# Patient Record
Sex: FEMALE | Race: WHITE | URBAN - NONMETROPOLITAN AREA
[De-identification: names, ages, dates, MRNs, and addresses within clinical notes are randomized per-mention and may not be internally consistent; named-entity substitution may affect disease eponyms.]

---

## 2022-04-05 ENCOUNTER — OFFICE VISIT (OUTPATIENT)
Dept: FAMILY MEDICINE CLINIC | Facility: CLINIC | Age: 62
End: 2022-04-05
Payer: COMMERCIAL

## 2022-04-05 VITALS
SYSTOLIC BLOOD PRESSURE: 132 MMHG | HEIGHT: 66.5 IN | TEMPERATURE: 99 F | BODY MASS INDEX: 38.83 KG/M2 | OXYGEN SATURATION: 95 % | DIASTOLIC BLOOD PRESSURE: 66 MMHG | HEART RATE: 84 BPM | RESPIRATION RATE: 12 BRPM | WEIGHT: 244.5 LBS

## 2022-04-05 DIAGNOSIS — L30.8 OTHER ECZEMA: Primary | ICD-10-CM

## 2022-04-05 DIAGNOSIS — F17.200 TOBACCO DEPENDENCE: ICD-10-CM

## 2022-04-05 DIAGNOSIS — Z12.31 VISIT FOR SCREENING MAMMOGRAM: ICD-10-CM

## 2022-04-05 DIAGNOSIS — Z12.11 SCREEN FOR COLON CANCER: ICD-10-CM

## 2022-04-05 PROCEDURE — 3075F SYST BP GE 130 - 139MM HG: CPT | Performed by: FAMILY MEDICINE

## 2022-04-05 PROCEDURE — 3008F BODY MASS INDEX DOCD: CPT | Performed by: FAMILY MEDICINE

## 2022-04-05 PROCEDURE — 99204 OFFICE O/P NEW MOD 45 MIN: CPT | Performed by: FAMILY MEDICINE

## 2022-04-05 PROCEDURE — 3078F DIAST BP <80 MM HG: CPT | Performed by: FAMILY MEDICINE

## 2022-04-05 RX ORDER — VIT A/VIT C/VIT E/ZINC/COPPER 7160-113
TABLET, DELAYED RELEASE (ENTERIC COATED) ORAL
COMMUNITY

## 2022-04-05 RX ORDER — CLOBETASOL PROPIONATE 0.5 MG/G
1 CREAM TOPICAL 2 TIMES DAILY
Qty: 60 G | Refills: 0 | Status: SHIPPED | OUTPATIENT
Start: 2022-04-05

## 2022-04-05 RX ORDER — PREDNISONE 20 MG/1
TABLET ORAL
Qty: 15 TABLET | Refills: 0 | Status: SHIPPED | OUTPATIENT
Start: 2022-04-05 | End: 2022-04-15

## 2022-04-05 RX ORDER — VARENICLINE TARTRATE 0.5 (11)-1
KIT ORAL
Qty: 1 EACH | Refills: 0 | Status: SHIPPED | OUTPATIENT
Start: 2022-04-05

## 2022-05-02 ENCOUNTER — TELEPHONE (OUTPATIENT)
Dept: FAMILY MEDICINE CLINIC | Facility: CLINIC | Age: 62
End: 2022-05-02

## 2022-05-02 RX ORDER — FUROSEMIDE 20 MG/1
20 TABLET ORAL DAILY
Qty: 30 TABLET | Refills: 0 | Status: SHIPPED | OUTPATIENT
Start: 2022-05-02

## 2022-05-02 NOTE — TELEPHONE ENCOUNTER
I see no water pill on her discharge medication list.    I think she could benefit from furosemide 20 mg daily. I would generally have her take it in the morning but she can take the first dose today if she gets it before 5. We can discuss whether she needs it long-term when I see her in follow-up.   Please send 30 tablets to the pharmacy

## 2022-05-02 NOTE — TELEPHONE ENCOUNTER
WANTS TO TALK TO NURSE ABOUT LEG SWELLING & BLISTERS ON LEGS, WAS DISCHARGED FROM HOSPITAL & WILL NEED FOLLOW UP APPT, CALL

## 2022-05-02 NOTE — TELEPHONE ENCOUNTER
Song from [facility] calling to verify dosage of magnesium po. Pt plavix & xarelot indicates there could be an increase of bleeding so they need to let us know that this is a possibility. She did discuss plavix & grapefruit juice with pt & advised she can't have grapefruit juice.

## 2022-05-02 NOTE — TELEPHONE ENCOUNTER
Patient was just released from [address] [place] [address] on Saturday. Yesterday her legs started to swell and had a pea sized blister. Today Galina Hankins states the blister is now a nickel size. Patient states she is elevating her legs.   Please advise

## 2022-05-04 ENCOUNTER — MED REC SCAN ONLY (OUTPATIENT)
Dept: FAMILY MEDICINE CLINIC | Facility: CLINIC | Age: 62
End: 2022-05-04

## 2022-05-10 ENCOUNTER — OFFICE VISIT (OUTPATIENT)
Dept: FAMILY MEDICINE CLINIC | Facility: CLINIC | Age: 62
End: 2022-05-10
Payer: COMMERCIAL

## 2022-05-10 VITALS
BODY MASS INDEX: 35.57 KG/M2 | SYSTOLIC BLOOD PRESSURE: 110 MMHG | HEART RATE: 90 BPM | DIASTOLIC BLOOD PRESSURE: 60 MMHG | OXYGEN SATURATION: 97 % | HEIGHT: 66.5 IN | TEMPERATURE: 99 F | WEIGHT: 224 LBS

## 2022-05-10 DIAGNOSIS — S27.0XXS TRAUMATIC PNEUMOTHORAX, SEQUELA: ICD-10-CM

## 2022-05-10 DIAGNOSIS — I73.9 LOWER EXTREMITY ARTERIAL INSUFFICIENCY, SEVERE, LEFT (HCC): ICD-10-CM

## 2022-05-10 DIAGNOSIS — F17.200 TOBACCO DEPENDENCE: ICD-10-CM

## 2022-05-10 DIAGNOSIS — L90.5: ICD-10-CM

## 2022-05-10 DIAGNOSIS — L97.921 ULCER OF LEFT LOWER LEG, LIMITED TO BREAKDOWN OF SKIN (HCC): ICD-10-CM

## 2022-05-10 DIAGNOSIS — J96.01 ACUTE RESPIRATORY FAILURE WITH HYPOXIA AND HYPERCAPNIA (HCC): Primary | ICD-10-CM

## 2022-05-10 DIAGNOSIS — J96.02 ACUTE RESPIRATORY FAILURE WITH HYPOXIA AND HYPERCAPNIA (HCC): Primary | ICD-10-CM

## 2022-05-10 PROCEDURE — 3078F DIAST BP <80 MM HG: CPT | Performed by: FAMILY MEDICINE

## 2022-05-10 PROCEDURE — 99214 OFFICE O/P EST MOD 30 MIN: CPT | Performed by: FAMILY MEDICINE

## 2022-05-10 PROCEDURE — 3008F BODY MASS INDEX DOCD: CPT | Performed by: FAMILY MEDICINE

## 2022-05-10 PROCEDURE — 3074F SYST BP LT 130 MM HG: CPT | Performed by: FAMILY MEDICINE

## 2022-05-10 RX ORDER — AMOXICILLIN AND CLAVULANATE POTASSIUM 875; 125 MG/1; MG/1
1 TABLET, FILM COATED ORAL 2 TIMES DAILY
COMMUNITY
Start: 2022-04-30

## 2022-05-10 RX ORDER — CLOPIDOGREL BISULFATE 75 MG/1
75 TABLET ORAL DAILY
COMMUNITY
Start: 2022-05-01

## 2022-05-10 RX ORDER — RIVAROXABAN 20 MG/1
TABLET, FILM COATED ORAL
COMMUNITY
Start: 2022-04-30

## 2022-05-10 RX ORDER — ALBUTEROL SULFATE 90 UG/1
2 AEROSOL, METERED RESPIRATORY (INHALATION)
COMMUNITY
Start: 2022-04-30

## 2022-05-13 ENCOUNTER — TELEPHONE (OUTPATIENT)
Dept: FAMILY MEDICINE CLINIC | Facility: CLINIC | Age: 62
End: 2022-05-13

## 2022-05-19 ENCOUNTER — MED REC SCAN ONLY (OUTPATIENT)
Dept: FAMILY MEDICINE CLINIC | Facility: CLINIC | Age: 62
End: 2022-05-19

## 2022-05-23 ENCOUNTER — TELEPHONE (OUTPATIENT)
Dept: FAMILY MEDICINE CLINIC | Facility: CLINIC | Age: 62
End: 2022-05-23

## 2022-05-23 DIAGNOSIS — J96.02 ACUTE RESPIRATORY FAILURE WITH HYPOXIA AND HYPERCAPNIA (HCC): Primary | ICD-10-CM

## 2022-05-23 DIAGNOSIS — S27.0XXS TRAUMATIC PNEUMOTHORAX, SEQUELA: ICD-10-CM

## 2022-05-23 DIAGNOSIS — J96.01 ACUTE RESPIRATORY FAILURE WITH HYPOXIA AND HYPERCAPNIA (HCC): Primary | ICD-10-CM

## 2022-05-23 DIAGNOSIS — I73.9 LOWER EXTREMITY ARTERIAL INSUFFICIENCY, SEVERE, LEFT (HCC): ICD-10-CM

## 2022-05-23 RX ORDER — FUROSEMIDE 20 MG/1
TABLET ORAL
Qty: 30 TABLET | Refills: 0 | OUTPATIENT
Start: 2022-05-23

## 2022-05-23 NOTE — TELEPHONE ENCOUNTER
Avi cotter from [facility] calling. Pt needs order for physical therapy. Pt would like to go somewhere in [place]. She will see pt May 31.   She would like pt to start in June

## 2022-05-23 NOTE — TELEPHONE ENCOUNTER
Discontinued - furosemide 20 MG Oral Tab    Take 1 tablet (20 mg total) by mouth daily. , Normal, Disp-30 tablet, R-0   Dispense: 30 tablet   Refills: 0 ordered   Pharmacy: [facility] [address] [place] [address] ([id number]), [phone], [phone] (Ph: [phone])   Order Details  Ordered on: 05/02/22   Discontinued on: 05/10/22   Discontinue reason: Patient discontinued   Authorizing provider: Mer Grey MD             LOV 05/10/2022    LAST LAB    LAST RX  Furosemide    Next OV   Future Appointments   Date Time Provider Amarjit Dockeryi   6/20/2022 10:30 AM Flaco Che MD [facility] [place]     PROTOCOL

## 2022-06-02 ENCOUNTER — TELEPHONE (OUTPATIENT)
Dept: FAMILY MEDICINE CLINIC | Facility: CLINIC | Age: 62
End: 2022-06-02

## 2022-06-02 ENCOUNTER — HOSPITAL ENCOUNTER (OUTPATIENT)
Dept: MAMMOGRAPHY | Age: 62
Discharge: HOME | End: 2022-06-02
Attending: FAMILY MEDICINE
Payer: COMMERCIAL

## 2022-06-02 ENCOUNTER — NURSE ONLY (OUTPATIENT)
Dept: FAMILY MEDICINE CLINIC | Facility: CLINIC | Age: 62
End: 2022-06-02
Payer: COMMERCIAL

## 2022-06-02 DIAGNOSIS — Z12.31 VISIT FOR SCREENING MAMMOGRAM: ICD-10-CM

## 2022-06-02 DIAGNOSIS — Z12.11 SCREEN FOR COLON CANCER: ICD-10-CM

## 2022-06-02 LAB — HEMOCCULT STL QL: NEGATIVE

## 2022-06-02 PROCEDURE — 82274 ASSAY TEST FOR BLOOD FECAL: CPT | Performed by: FAMILY MEDICINE

## 2022-06-02 PROCEDURE — 77067 SCR MAMMO BI INCL CAD: CPT | Performed by: FAMILY MEDICINE

## 2022-06-03 ENCOUNTER — TELEPHONE (OUTPATIENT)
Dept: FAMILY MEDICINE CLINIC | Facility: CLINIC | Age: 62
End: 2022-06-03

## 2022-06-03 NOTE — TELEPHONE ENCOUNTER
Patient advised that paperwork was written out and faxed to [facility] as of 6/3/22. Patient verbalized understanding.

## 2022-06-03 NOTE — TELEPHONE ENCOUNTER
Pt received call from [facility] asking if pt was going back to work on Monday. She thought  was extending her flma until end of July. Please call to discuss.

## 2022-06-06 ENCOUNTER — MED REC SCAN ONLY (OUTPATIENT)
Dept: FAMILY MEDICINE CLINIC | Facility: CLINIC | Age: 62
End: 2022-06-06

## 2022-06-13 ENCOUNTER — TELEPHONE (OUTPATIENT)
Dept: PHYSICAL THERAPY | Facility: HOSPITAL | Age: 62
End: 2022-06-13

## 2022-06-13 ENCOUNTER — TELEPHONE (OUTPATIENT)
Dept: FAMILY MEDICINE CLINIC | Facility: CLINIC | Age: 62
End: 2022-06-13

## 2022-06-13 NOTE — TELEPHONE ENCOUNTER
Pt. Said her [employer]. Told her she needs to get a \"ADA\" on her FMLA pw? Please call pt. To discuss.

## 2022-06-14 ENCOUNTER — OFFICE VISIT (OUTPATIENT)
Dept: PHYSICAL THERAPY | Age: 62
End: 2022-06-14
Attending: FAMILY MEDICINE
Payer: COMMERCIAL

## 2022-06-14 DIAGNOSIS — J96.01 ACUTE RESPIRATORY FAILURE WITH HYPOXIA AND HYPERCAPNIA (HCC): ICD-10-CM

## 2022-06-14 DIAGNOSIS — J96.02 ACUTE RESPIRATORY FAILURE WITH HYPOXIA AND HYPERCAPNIA (HCC): ICD-10-CM

## 2022-06-14 DIAGNOSIS — I73.9 LOWER EXTREMITY ARTERIAL INSUFFICIENCY, SEVERE, LEFT (HCC): ICD-10-CM

## 2022-06-14 DIAGNOSIS — S27.0XXS TRAUMATIC PNEUMOTHORAX, SEQUELA: ICD-10-CM

## 2022-06-14 PROCEDURE — 97110 THERAPEUTIC EXERCISES: CPT

## 2022-06-14 PROCEDURE — 97162 PT EVAL MOD COMPLEX 30 MIN: CPT

## 2022-06-16 ENCOUNTER — MED REC SCAN ONLY (OUTPATIENT)
Dept: FAMILY MEDICINE CLINIC | Facility: CLINIC | Age: 62
End: 2022-06-16

## 2022-06-17 ENCOUNTER — OFFICE VISIT (OUTPATIENT)
Dept: PHYSICAL THERAPY | Age: 62
End: 2022-06-17
Attending: FAMILY MEDICINE
Payer: COMMERCIAL

## 2022-06-17 PROCEDURE — 97112 NEUROMUSCULAR REEDUCATION: CPT

## 2022-06-17 PROCEDURE — 97110 THERAPEUTIC EXERCISES: CPT

## 2022-06-20 ENCOUNTER — APPOINTMENT (OUTPATIENT)
Dept: PHYSICAL THERAPY | Age: 62
End: 2022-06-20
Attending: FAMILY MEDICINE
Payer: COMMERCIAL

## 2022-06-21 NOTE — TELEPHONE ENCOUNTER
LOV 05/10/2022    LAST LAB 04/28/2022    LAST RX  Metformin #30 R0 05/26/2022    Next OV   Future Appointments   Date Time Provider Amarjit Humphrey   6/23/2022  9:45 AM Doris Grey MD [facility] [place]       PROTOCOL

## 2022-06-23 ENCOUNTER — LABORATORY ENCOUNTER (OUTPATIENT)
Dept: LAB | Age: 62
End: 2022-06-23
Attending: FAMILY MEDICINE
Payer: COMMERCIAL

## 2022-06-23 ENCOUNTER — OFFICE VISIT (OUTPATIENT)
Dept: FAMILY MEDICINE CLINIC | Facility: CLINIC | Age: 62
End: 2022-06-23
Payer: COMMERCIAL

## 2022-06-23 ENCOUNTER — APPOINTMENT (OUTPATIENT)
Dept: PHYSICAL THERAPY | Age: 62
End: 2022-06-23
Attending: FAMILY MEDICINE
Payer: COMMERCIAL

## 2022-06-23 VITALS
SYSTOLIC BLOOD PRESSURE: 138 MMHG | DIASTOLIC BLOOD PRESSURE: 66 MMHG | RESPIRATION RATE: 12 BRPM | BODY MASS INDEX: 35.42 KG/M2 | OXYGEN SATURATION: 100 % | WEIGHT: 223 LBS | HEART RATE: 81 BPM | TEMPERATURE: 97 F | HEIGHT: 66.5 IN

## 2022-06-23 DIAGNOSIS — E11.65 TYPE 2 DIABETES MELLITUS WITH HYPERGLYCEMIA, WITHOUT LONG-TERM CURRENT USE OF INSULIN (HCC): ICD-10-CM

## 2022-06-23 DIAGNOSIS — L90.5: ICD-10-CM

## 2022-06-23 DIAGNOSIS — J86.9 EMPYEMA LUNG (HCC): ICD-10-CM

## 2022-06-23 DIAGNOSIS — I73.9 LOWER EXTREMITY ARTERIAL INSUFFICIENCY, SEVERE, LEFT (HCC): ICD-10-CM

## 2022-06-23 DIAGNOSIS — I73.9 PERIPHERAL VASCULAR DISEASE (HCC): ICD-10-CM

## 2022-06-23 DIAGNOSIS — L97.921 ULCER OF LEFT LOWER LEG, LIMITED TO BREAKDOWN OF SKIN (HCC): ICD-10-CM

## 2022-06-23 DIAGNOSIS — I73.9 PERIPHERAL VASCULAR DISEASE (HCC): Primary | ICD-10-CM

## 2022-06-23 LAB
ALBUMIN SERPL-MCNC: 3.4 G/DL (ref 3.4–5)
ALBUMIN/GLOB SERPL: 0.9 {RATIO} (ref 1–2)
ALP LIVER SERPL-CCNC: 90 U/L
ALT SERPL-CCNC: 16 U/L
ANION GAP SERPL CALC-SCNC: 5 MMOL/L (ref 0–18)
AST SERPL-CCNC: 9 U/L (ref 15–37)
BILIRUB SERPL-MCNC: 0.4 MG/DL (ref 0.1–2)
BUN BLD-MCNC: 18 MG/DL (ref 7–18)
CALCIUM BLD-MCNC: 10 MG/DL (ref 8.5–10.1)
CHLORIDE SERPL-SCNC: 105 MMOL/L (ref 98–112)
CHOLEST SERPL-MCNC: 188 MG/DL (ref ?–200)
CO2 SERPL-SCNC: 28 MMOL/L (ref 21–32)
CREAT BLD-MCNC: 0.91 MG/DL
EST. AVERAGE GLUCOSE BLD GHB EST-MCNC: 243 MG/DL (ref 68–126)
FASTING PATIENT LIPID ANSWER: NO
FASTING STATUS PATIENT QL REPORTED: NO
GLOBULIN PLAS-MCNC: 3.8 G/DL (ref 2.8–4.4)
GLUCOSE BLD-MCNC: 319 MG/DL (ref 70–99)
HBA1C MFR BLD: 10.1 % (ref ?–5.7)
HDLC SERPL-MCNC: 57 MG/DL (ref 40–59)
LDLC SERPL CALC-MCNC: 100 MG/DL (ref ?–100)
NONHDLC SERPL-MCNC: 131 MG/DL (ref ?–130)
OSMOLALITY SERPL CALC.SUM OF ELEC: 300 MOSM/KG (ref 275–295)
POTASSIUM SERPL-SCNC: 5.7 MMOL/L (ref 3.5–5.1)
PROT SERPL-MCNC: 7.2 G/DL (ref 6.4–8.2)
SODIUM SERPL-SCNC: 138 MMOL/L (ref 136–145)
TRIGL SERPL-MCNC: 178 MG/DL (ref 30–149)
VLDLC SERPL CALC-MCNC: 30 MG/DL (ref 0–30)

## 2022-06-23 PROCEDURE — 3008F BODY MASS INDEX DOCD: CPT | Performed by: FAMILY MEDICINE

## 2022-06-23 PROCEDURE — 3075F SYST BP GE 130 - 139MM HG: CPT | Performed by: FAMILY MEDICINE

## 2022-06-23 PROCEDURE — 83036 HEMOGLOBIN GLYCOSYLATED A1C: CPT | Performed by: FAMILY MEDICINE

## 2022-06-23 PROCEDURE — 99214 OFFICE O/P EST MOD 30 MIN: CPT | Performed by: FAMILY MEDICINE

## 2022-06-23 PROCEDURE — 3046F HEMOGLOBIN A1C LEVEL >9.0%: CPT | Performed by: FAMILY MEDICINE

## 2022-06-23 PROCEDURE — 3078F DIAST BP <80 MM HG: CPT | Performed by: FAMILY MEDICINE

## 2022-06-23 PROCEDURE — 80061 LIPID PANEL: CPT | Performed by: FAMILY MEDICINE

## 2022-06-23 PROCEDURE — 80053 COMPREHEN METABOLIC PANEL: CPT | Performed by: FAMILY MEDICINE

## 2022-06-23 RX ORDER — DIPHENHYDRAMINE HCL 25 MG
1 TABLET ORAL AS DIRECTED
COMMUNITY
Start: 2022-04-30

## 2022-06-23 RX ORDER — LANCETS 30 GAUGE
1 EACH MISCELLANEOUS AS DIRECTED
COMMUNITY
Start: 2022-04-30

## 2022-06-23 RX ORDER — ATORVASTATIN CALCIUM 40 MG/1
40 TABLET, FILM COATED ORAL DAILY
COMMUNITY
Start: 2022-05-19

## 2022-06-23 RX ORDER — CALCIUM CITRATE/VITAMIN D3 200MG-6.25
TABLET ORAL
COMMUNITY
Start: 2022-04-30

## 2022-06-27 ENCOUNTER — APPOINTMENT (OUTPATIENT)
Dept: PHYSICAL THERAPY | Age: 62
End: 2022-06-27
Attending: FAMILY MEDICINE
Payer: COMMERCIAL

## 2022-06-30 ENCOUNTER — APPOINTMENT (OUTPATIENT)
Dept: PHYSICAL THERAPY | Age: 62
End: 2022-06-30
Attending: FAMILY MEDICINE
Payer: COMMERCIAL

## 2022-06-30 DIAGNOSIS — E11.65 TYPE 2 DIABETES MELLITUS WITH HYPERGLYCEMIA, WITHOUT LONG-TERM CURRENT USE OF INSULIN (HCC): Primary | ICD-10-CM

## 2022-06-30 RX ORDER — GLIMEPIRIDE 4 MG/1
4 TABLET ORAL
Qty: 90 TABLET | Refills: 0 | Status: SHIPPED | OUTPATIENT
Start: 2022-06-30

## 2022-07-05 ENCOUNTER — APPOINTMENT (OUTPATIENT)
Dept: PHYSICAL THERAPY | Age: 62
End: 2022-07-05
Attending: FAMILY MEDICINE
Payer: COMMERCIAL

## 2022-07-07 ENCOUNTER — APPOINTMENT (OUTPATIENT)
Dept: PHYSICAL THERAPY | Age: 62
End: 2022-07-07
Attending: FAMILY MEDICINE
Payer: COMMERCIAL

## 2022-07-18 RX ORDER — RIVAROXABAN 20 MG/1
TABLET, FILM COATED ORAL
Qty: 90 TABLET | Refills: 0 | Status: SHIPPED | OUTPATIENT
Start: 2022-07-18

## 2022-07-20 RX ORDER — CLOPIDOGREL BISULFATE 75 MG/1
75 TABLET ORAL DAILY
Qty: 90 TABLET | Refills: 0 | Status: SHIPPED | OUTPATIENT
Start: 2022-07-20 | End: 2022-10-18

## 2022-07-26 ENCOUNTER — OFFICE VISIT (OUTPATIENT)
Dept: FAMILY MEDICINE CLINIC | Facility: CLINIC | Age: 62
End: 2022-07-26
Payer: COMMERCIAL

## 2022-07-26 VITALS
SYSTOLIC BLOOD PRESSURE: 130 MMHG | WEIGHT: 230 LBS | OXYGEN SATURATION: 100 % | TEMPERATURE: 97 F | HEIGHT: 65.5 IN | RESPIRATION RATE: 12 BRPM | DIASTOLIC BLOOD PRESSURE: 80 MMHG | BODY MASS INDEX: 37.86 KG/M2 | HEART RATE: 89 BPM

## 2022-07-26 DIAGNOSIS — J96.01 ACUTE RESPIRATORY FAILURE WITH HYPOXIA AND HYPERCAPNIA (HCC): ICD-10-CM

## 2022-07-26 DIAGNOSIS — J86.9 EMPYEMA LUNG (HCC): ICD-10-CM

## 2022-07-26 DIAGNOSIS — L90.5: ICD-10-CM

## 2022-07-26 DIAGNOSIS — E11.65 TYPE 2 DIABETES MELLITUS WITH HYPERGLYCEMIA, WITHOUT LONG-TERM CURRENT USE OF INSULIN (HCC): Primary | ICD-10-CM

## 2022-07-26 DIAGNOSIS — J96.02 ACUTE RESPIRATORY FAILURE WITH HYPOXIA AND HYPERCAPNIA (HCC): ICD-10-CM

## 2022-07-26 DIAGNOSIS — I73.9 PERIPHERAL VASCULAR DISEASE (HCC): ICD-10-CM

## 2022-07-26 LAB
CREAT UR-SCNC: 79 MG/DL
MICROALBUMIN UR-MCNC: 2.86 MG/DL
MICROALBUMIN/CREAT 24H UR-RTO: 36.2 UG/MG (ref ?–30)

## 2022-07-26 PROCEDURE — 3079F DIAST BP 80-89 MM HG: CPT | Performed by: FAMILY MEDICINE

## 2022-07-26 PROCEDURE — 82570 ASSAY OF URINE CREATININE: CPT | Performed by: FAMILY MEDICINE

## 2022-07-26 PROCEDURE — 3075F SYST BP GE 130 - 139MM HG: CPT | Performed by: FAMILY MEDICINE

## 2022-07-26 PROCEDURE — 82043 UR ALBUMIN QUANTITATIVE: CPT | Performed by: FAMILY MEDICINE

## 2022-07-26 PROCEDURE — 99214 OFFICE O/P EST MOD 30 MIN: CPT | Performed by: FAMILY MEDICINE

## 2022-07-26 PROCEDURE — 3008F BODY MASS INDEX DOCD: CPT | Performed by: FAMILY MEDICINE

## 2022-09-26 ENCOUNTER — TELEPHONE (OUTPATIENT)
Dept: FAMILY MEDICINE CLINIC | Facility: CLINIC | Age: 62
End: 2022-09-26

## 2022-09-26 RX ORDER — GLIMEPIRIDE 4 MG/1
TABLET ORAL
Qty: 90 TABLET | Refills: 0 | Status: SHIPPED | OUTPATIENT
Start: 2022-09-26

## 2022-09-26 NOTE — TELEPHONE ENCOUNTER
PT CALLING REGARDING    GLIMEPIRIDE 4 MG Oral Tab    PHARMACY DENIED REFILL-    PLEASE ADVISE- OK TO LEAVE MESSAGE-

## 2022-09-26 NOTE — TELEPHONE ENCOUNTER
Left message for patient - medication was refilled by this office this am.  Requested call back from patient.

## 2022-10-14 RX ORDER — CLOPIDOGREL BISULFATE 75 MG/1
TABLET ORAL
Qty: 90 TABLET | Refills: 3 | Status: SHIPPED | OUTPATIENT
Start: 2022-10-14

## 2022-10-14 RX ORDER — RIVAROXABAN 20 MG/1
TABLET, FILM COATED ORAL
Qty: 90 TABLET | Refills: 3 | Status: SHIPPED | OUTPATIENT
Start: 2022-10-14

## 2022-12-12 ENCOUNTER — MED REC SCAN ONLY (OUTPATIENT)
Dept: FAMILY MEDICINE CLINIC | Facility: CLINIC | Age: 62
End: 2022-12-12

## 2022-12-27 RX ORDER — GLIMEPIRIDE 4 MG/1
TABLET ORAL
Qty: 30 TABLET | Refills: 0 | Status: SHIPPED | OUTPATIENT
Start: 2022-12-27 | End: 2023-01-26

## 2022-12-27 NOTE — TELEPHONE ENCOUNTER
Refill did not pass protocol  [employer] message sent for patient to make a lab appointment. Is due for A1C. Last one was 6/23/22  10.1    Refill for 30?

## 2023-01-18 ENCOUNTER — OFFICE VISIT (OUTPATIENT)
Dept: FAMILY MEDICINE CLINIC | Facility: CLINIC | Age: 63
End: 2023-01-18
Payer: COMMERCIAL

## 2023-01-18 VITALS
TEMPERATURE: 98 F | OXYGEN SATURATION: 97 % | DIASTOLIC BLOOD PRESSURE: 70 MMHG | RESPIRATION RATE: 12 BRPM | SYSTOLIC BLOOD PRESSURE: 144 MMHG | WEIGHT: 244 LBS | HEART RATE: 76 BPM | BODY MASS INDEX: 40.17 KG/M2 | HEIGHT: 65.5 IN

## 2023-01-18 DIAGNOSIS — B02.9 HERPES ZOSTER WITHOUT COMPLICATION: Primary | ICD-10-CM

## 2023-01-18 PROCEDURE — 3008F BODY MASS INDEX DOCD: CPT | Performed by: FAMILY MEDICINE

## 2023-01-18 PROCEDURE — 99213 OFFICE O/P EST LOW 20 MIN: CPT | Performed by: FAMILY MEDICINE

## 2023-01-18 PROCEDURE — 3078F DIAST BP <80 MM HG: CPT | Performed by: FAMILY MEDICINE

## 2023-01-18 PROCEDURE — 3077F SYST BP >= 140 MM HG: CPT | Performed by: FAMILY MEDICINE

## 2023-01-18 RX ORDER — PREDNISONE 20 MG/1
TABLET ORAL
Qty: 10 TABLET | Refills: 0 | Status: SHIPPED | OUTPATIENT
Start: 2023-01-18 | End: 2023-01-28

## 2023-01-18 RX ORDER — VALACYCLOVIR HYDROCHLORIDE 1 G/1
1000 TABLET, FILM COATED ORAL 3 TIMES DAILY
Qty: 21 TABLET | Refills: 0 | Status: SHIPPED | OUTPATIENT
Start: 2023-01-18 | End: 2023-01-25

## 2023-01-18 RX ORDER — HYDROCODONE BITARTRATE AND ACETAMINOPHEN 5; 325 MG/1; MG/1
1 TABLET ORAL 2 TIMES DAILY PRN
Qty: 14 TABLET | Refills: 0 | Status: SHIPPED | OUTPATIENT
Start: 2023-01-18 | End: 2023-01-25

## 2023-01-23 RX ORDER — GLIMEPIRIDE 4 MG/1
4 TABLET ORAL
Qty: 30 TABLET | Refills: 1 | Status: SHIPPED | OUTPATIENT
Start: 2023-01-23

## 2023-01-23 NOTE — TELEPHONE ENCOUNTER
LOV 1-18-23    LAST LAB 6-23-22 HgbA1c 10.1    LAST RX   GLIMEPIRIDE 4 MG Oral Tab 30 tablet 0 12/27/2022 1/26/2023         Next OV No future appointments.     PROTOCOL   Diabetic Medication Protocol Failed 01/21/2023 10:29 AM   Protocol Details  HgBA1C procedure resulted in past 6 months    Last HgBA1C < 7.5    Microalbumin procedure in past 12 months or taking ACE/ARB    Appointment in past 6 or next 3 months

## 2023-03-15 ENCOUNTER — TELEPHONE (OUTPATIENT)
Dept: FAMILY MEDICINE CLINIC | Facility: CLINIC | Age: 63
End: 2023-03-15

## 2023-03-15 DIAGNOSIS — Z12.11 SCREENING FOR COLON CANCER: Primary | ICD-10-CM

## 2023-03-20 RX ORDER — GLIMEPIRIDE 4 MG/1
TABLET ORAL
Qty: 30 TABLET | Refills: 1 | Status: SHIPPED | OUTPATIENT
Start: 2023-03-20

## 2023-03-20 NOTE — TELEPHONE ENCOUNTER
Glimepiride 4 MG oral Tab    Diabetic Medication Protocol Failed 03/19/2023 02:31 PM   Protocol Details  HgBA1C procedure resulted in past 6 months    Last HgBA1C < 7.5    Microalbumin procedure in past 12 months or taking ACE/ARB    Appointment in past 6 or next 3 months     Last office visit: 1/18/23  No future appointments. Last filled: 1/23/23  #30 with 1 refill  Last labs: 6/23/22     Called and LM for William Luu to let her know that she was due for fasting labs and to give the office a call to set this appt up.

## 2023-05-04 ENCOUNTER — LABORATORY ENCOUNTER (OUTPATIENT)
Dept: LAB | Age: 63
End: 2023-05-04
Attending: FAMILY MEDICINE
Payer: COMMERCIAL

## 2023-05-04 DIAGNOSIS — E11.65 TYPE 2 DIABETES MELLITUS WITH HYPERGLYCEMIA, WITHOUT LONG-TERM CURRENT USE OF INSULIN (HCC): ICD-10-CM

## 2023-05-04 LAB
ALBUMIN SERPL-MCNC: 3.7 G/DL (ref 3.4–5)
ALBUMIN/GLOB SERPL: 0.9 {RATIO} (ref 1–2)
ALP LIVER SERPL-CCNC: 88 U/L
ALT SERPL-CCNC: 20 U/L
ANION GAP SERPL CALC-SCNC: 4 MMOL/L (ref 0–18)
AST SERPL-CCNC: 9 U/L (ref 15–37)
BILIRUB SERPL-MCNC: 0.4 MG/DL (ref 0.1–2)
BUN BLD-MCNC: 27 MG/DL (ref 7–18)
CALCIUM BLD-MCNC: 9.6 MG/DL (ref 8.5–10.1)
CHLORIDE SERPL-SCNC: 105 MMOL/L (ref 98–112)
CHOLEST SERPL-MCNC: 193 MG/DL (ref ?–200)
CO2 SERPL-SCNC: 27 MMOL/L (ref 21–32)
CREAT BLD-MCNC: 0.88 MG/DL
EST. AVERAGE GLUCOSE BLD GHB EST-MCNC: 217 MG/DL (ref 68–126)
FASTING PATIENT LIPID ANSWER: YES
FASTING STATUS PATIENT QL REPORTED: YES
GFR SERPLBLD BASED ON 1.73 SQ M-ARVRAT: 74 ML/MIN/1.73M2 (ref 60–?)
GLOBULIN PLAS-MCNC: 3.9 G/DL (ref 2.8–4.4)
GLUCOSE BLD-MCNC: 205 MG/DL (ref 70–99)
HBA1C MFR BLD: 9.2 % (ref ?–5.7)
HDLC SERPL-MCNC: 57 MG/DL (ref 40–59)
LDLC SERPL CALC-MCNC: 111 MG/DL (ref ?–100)
NONHDLC SERPL-MCNC: 136 MG/DL (ref ?–130)
OSMOLALITY SERPL CALC.SUM OF ELEC: 293 MOSM/KG (ref 275–295)
POTASSIUM SERPL-SCNC: 5 MMOL/L (ref 3.5–5.1)
PROT SERPL-MCNC: 7.6 G/DL (ref 6.4–8.2)
SODIUM SERPL-SCNC: 136 MMOL/L (ref 136–145)
TRIGL SERPL-MCNC: 142 MG/DL (ref 30–149)
VLDLC SERPL CALC-MCNC: 24 MG/DL (ref 0–30)

## 2023-05-04 PROCEDURE — 80061 LIPID PANEL: CPT

## 2023-05-04 PROCEDURE — 80053 COMPREHEN METABOLIC PANEL: CPT

## 2023-05-04 PROCEDURE — 83036 HEMOGLOBIN GLYCOSYLATED A1C: CPT

## 2023-05-04 PROCEDURE — 36415 COLL VENOUS BLD VENIPUNCTURE: CPT

## 2023-05-05 DIAGNOSIS — E11.65 TYPE 2 DIABETES MELLITUS WITH HYPERGLYCEMIA, WITHOUT LONG-TERM CURRENT USE OF INSULIN (HCC): Primary | ICD-10-CM

## 2023-05-15 RX ORDER — GLIMEPIRIDE 4 MG/1
TABLET ORAL
Qty: 30 TABLET | Refills: 1 | Status: SHIPPED | OUTPATIENT
Start: 2023-05-15

## 2023-05-15 NOTE — TELEPHONE ENCOUNTER
Diabetic Medication Protocol Failed 05/14/2023 07:12 AM   Protocol Details  Last HgBA1C < 7.5    HgBA1C procedure resulted in past 6 months    Microalbumin procedure in past 12 months or taking ACE/ARB    Appointment in past 6 or next 3 months         Last refill - Glimepiride - 3/20/23 - #30 with 1 refill  Last A1c - 5/4/23 - 9.2  Last office visit - 5/4/23

## 2023-05-16 ENCOUNTER — MED REC SCAN ONLY (OUTPATIENT)
Dept: FAMILY MEDICINE CLINIC | Facility: CLINIC | Age: 63
End: 2023-05-16

## 2023-06-21 ENCOUNTER — OFFICE VISIT (OUTPATIENT)
Dept: FAMILY MEDICINE CLINIC | Facility: CLINIC | Age: 63
End: 2023-06-21
Payer: COMMERCIAL

## 2023-06-21 VITALS
SYSTOLIC BLOOD PRESSURE: 130 MMHG | OXYGEN SATURATION: 92 % | HEART RATE: 67 BPM | RESPIRATION RATE: 16 BRPM | WEIGHT: 252 LBS | TEMPERATURE: 98 F | DIASTOLIC BLOOD PRESSURE: 72 MMHG | HEIGHT: 65.5 IN | BODY MASS INDEX: 41.48 KG/M2

## 2023-06-21 DIAGNOSIS — R35.0 URINARY FREQUENCY: Primary | ICD-10-CM

## 2023-06-21 DIAGNOSIS — N30.01 ACUTE CYSTITIS WITH HEMATURIA: ICD-10-CM

## 2023-06-21 LAB
BILIRUBIN: NEGATIVE
GLUCOSE (URINE DIPSTICK): NEGATIVE MG/DL
KETONES (URINE DIPSTICK): NEGATIVE MG/DL
LEUKOCYTES: NEGATIVE
MULTISTIX LOT#: ABNORMAL NUMERIC
NITRITE, URINE: NEGATIVE
PH, URINE: 6 (ref 4.5–8)
PROTEIN (URINE DIPSTICK): 100 MG/DL
SPECIFIC GRAVITY: 1.02 (ref 1–1.03)
UROBILINOGEN,SEMI-QN: 0.2 MG/DL (ref 0–1.9)

## 2023-06-21 PROCEDURE — 3075F SYST BP GE 130 - 139MM HG: CPT

## 2023-06-21 PROCEDURE — 3046F HEMOGLOBIN A1C LEVEL >9.0%: CPT

## 2023-06-21 PROCEDURE — 81003 URINALYSIS AUTO W/O SCOPE: CPT

## 2023-06-21 PROCEDURE — 3008F BODY MASS INDEX DOCD: CPT

## 2023-06-21 PROCEDURE — 87086 URINE CULTURE/COLONY COUNT: CPT

## 2023-06-21 PROCEDURE — 3078F DIAST BP <80 MM HG: CPT

## 2023-06-21 PROCEDURE — 99213 OFFICE O/P EST LOW 20 MIN: CPT

## 2023-06-21 RX ORDER — ASPIRIN 81 MG/1
81 TABLET ORAL DAILY
COMMUNITY

## 2023-06-21 RX ORDER — NITROFURANTOIN 25; 75 MG/1; MG/1
100 CAPSULE ORAL 2 TIMES DAILY
Qty: 10 CAPSULE | Refills: 0 | Status: SHIPPED | OUTPATIENT
Start: 2023-06-21 | End: 2023-06-26

## 2023-06-23 ENCOUNTER — TELEPHONE (OUTPATIENT)
Dept: FAMILY MEDICINE CLINIC | Facility: CLINIC | Age: 63
End: 2023-06-23

## 2023-06-29 RX ORDER — GLIMEPIRIDE 4 MG/1
TABLET ORAL
Qty: 30 TABLET | Refills: 1 | Status: SHIPPED | OUTPATIENT
Start: 2023-06-29

## 2023-07-05 NOTE — TELEPHONE ENCOUNTER
Last refill -   Clopidogrel - 10/14/22 - #90 with 3 refills  Xarelto - 10/14/22 - #90 with 3 refills  [facility] sent to patient to check with pharmacy

## 2023-07-10 RX ORDER — RIVAROXABAN 20 MG/1
TABLET, FILM COATED ORAL
Qty: 90 TABLET | Refills: 3 | OUTPATIENT
Start: 2023-07-10

## 2023-07-10 RX ORDER — CLOPIDOGREL BISULFATE 75 MG/1
TABLET ORAL
Qty: 90 TABLET | Refills: 3 | OUTPATIENT
Start: 2023-07-10

## 2023-09-21 NOTE — TELEPHONE ENCOUNTER
Last office visit: 5/4/23  Last refill: 6/10/23  Labs Due: 8/5/23  [address] MESSAGE SENT TO PATIENT SENT TO PATIENT TO SCHEDULE LAB WORK.

## 2023-10-30 RX ORDER — CLOPIDOGREL BISULFATE 75 MG/1
75 TABLET ORAL DAILY
Qty: 90 TABLET | Refills: 3 | Status: SHIPPED | OUTPATIENT
Start: 2023-10-30

## 2023-10-30 NOTE — TELEPHONE ENCOUNTER
Last refill: 10/14/22  Qty: 90  W/ 3 refills   Last ov: 05/04/23     Requested Prescriptions     Pending Prescriptions Disp Refills    clopidogrel 75 MG Oral Tab 90 tablet 3     Sig: Take 1 tablet (75 mg total) by mouth daily. No future appointments.

## 2023-11-03 RX ORDER — RIVAROXABAN 20 MG/1
TABLET, FILM COATED ORAL
Qty: 90 TABLET | Refills: 3 | OUTPATIENT
Start: 2023-11-03

## 2023-11-09 RX ORDER — RIVAROXABAN 20 MG/1
TABLET, FILM COATED ORAL
Qty: 90 TABLET | Refills: 3 | Status: SHIPPED | OUTPATIENT
Start: 2023-11-09

## 2023-11-09 NOTE — TELEPHONE ENCOUNTER
Last office visit: 5/4/23  Last refill: 10/14/22  Labs Due: 8/5/23  [place] MESSAGE SENT TO PATIENT TO SCHEDULE LAB WORK. No future appointments.

## 2023-11-30 ENCOUNTER — TELEPHONE (OUTPATIENT)
Dept: FAMILY MEDICINE CLINIC | Facility: CLINIC | Age: 63
End: 2023-11-30

## 2023-12-18 NOTE — TELEPHONE ENCOUNTER
Diabetic Medication Protocol [id number]/16/2023 12:07 PM   Protocol Details HgBA1C procedure resulted in past 6 months    Last HgBA1C < 7.5    Microalbumin procedure in past 12 months or taking ACE/ARB    Appointment in past 6 or next 3 months     Last refill - 9/22/23 - #90   Last A1c - 5/4/23 - 9.2  Last office visit - 5/4/23  Left message for patient to call. Was due in August for labs. Contacted patient. She is unable to come in for labs at this time. She has an appointment scheduled and wants to know if she can do labs at that time.   Future Appointments   Date Time Provider Amarjit Dockeryi   1/9/2024  4:00 PM Solomon Palomino MD [facility] [place]

## 2024-01-30 ENCOUNTER — OFFICE VISIT (OUTPATIENT)
Dept: FAMILY MEDICINE CLINIC | Facility: CLINIC | Age: 64
End: 2024-01-30
Payer: COMMERCIAL

## 2024-01-30 VITALS
SYSTOLIC BLOOD PRESSURE: 124 MMHG | BODY MASS INDEX: 41.51 KG/M2 | RESPIRATION RATE: 12 BRPM | HEIGHT: 65 IN | OXYGEN SATURATION: 99 % | HEART RATE: 70 BPM | TEMPERATURE: 98 F | DIASTOLIC BLOOD PRESSURE: 74 MMHG | WEIGHT: 249.13 LBS

## 2024-01-30 DIAGNOSIS — F43.21 GRIEF: ICD-10-CM

## 2024-01-30 DIAGNOSIS — F17.200 TOBACCO DEPENDENCE: ICD-10-CM

## 2024-01-30 DIAGNOSIS — Z23 NEED FOR VACCINATION: ICD-10-CM

## 2024-01-30 DIAGNOSIS — I48.0 PAROXYSMAL ATRIAL FIBRILLATION (HCC): ICD-10-CM

## 2024-01-30 DIAGNOSIS — Z12.31 VISIT FOR SCREENING MAMMOGRAM: ICD-10-CM

## 2024-01-30 DIAGNOSIS — Z79.899 ENCOUNTER FOR LONG-TERM (CURRENT) USE OF MEDICATIONS: ICD-10-CM

## 2024-01-30 DIAGNOSIS — Z00.00 WELL ADULT EXAM: Primary | ICD-10-CM

## 2024-01-30 DIAGNOSIS — I73.9 PERIPHERAL VASCULAR DISEASE (HCC): ICD-10-CM

## 2024-01-30 DIAGNOSIS — E66.01 OBESITY, CLASS III, BMI 40-49.9 (MORBID OBESITY) (HCC): ICD-10-CM

## 2024-01-30 DIAGNOSIS — E11.65 TYPE 2 DIABETES MELLITUS WITH HYPERGLYCEMIA, WITHOUT LONG-TERM CURRENT USE OF INSULIN (HCC): ICD-10-CM

## 2024-01-30 PROBLEM — E66.813 OBESITY, CLASS III, BMI 40-49.9 (MORBID OBESITY): Status: ACTIVE | Noted: 2024-01-30

## 2024-01-30 LAB
ALBUMIN SERPL-MCNC: 3.3 G/DL (ref 3.4–5)
ALBUMIN/GLOB SERPL: 0.9 {RATIO} (ref 1–2)
ALP LIVER SERPL-CCNC: 83 U/L
ANION GAP SERPL CALC-SCNC: 3 MMOL/L (ref 0–18)
AST SERPL-CCNC: 14 U/L (ref 15–37)
BILIRUB SERPL-MCNC: 0.3 MG/DL (ref 0.1–2)
BUN BLD-MCNC: 31 MG/DL (ref 9–23)
CALCIUM BLD-MCNC: 9.2 MG/DL (ref 8.5–10.1)
CHLORIDE SERPL-SCNC: 108 MMOL/L (ref 98–112)
CHOLEST SERPL-MCNC: 291 MG/DL (ref ?–200)
CO2 SERPL-SCNC: 28 MMOL/L (ref 21–32)
CREAT BLD-MCNC: 0.99 MG/DL
CREAT UR-SCNC: 105 MG/DL
EGFRCR SERPLBLD CKD-EPI 2021: 64 ML/MIN/1.73M2 (ref 60–?)
FASTING PATIENT LIPID ANSWER: NO
FASTING STATUS PATIENT QL REPORTED: NO
GLOBULIN PLAS-MCNC: 3.8 G/DL (ref 2.8–4.4)
GLUCOSE BLD-MCNC: 196 MG/DL (ref 70–99)
HDLC SERPL-MCNC: 55 MG/DL (ref 40–59)
LDLC SERPL CALC-MCNC: 201 MG/DL (ref ?–100)
MICROALBUMIN UR-MCNC: 181 MG/DL
MICROALBUMIN/CREAT 24H UR-RTO: 1723.8 UG/MG (ref ?–30)
NONHDLC SERPL-MCNC: 236 MG/DL (ref ?–130)
OSMOLALITY SERPL CALC.SUM OF ELEC: 300 MOSM/KG (ref 275–295)
POTASSIUM SERPL-SCNC: 5.6 MMOL/L (ref 3.5–5.1)
PROT SERPL-MCNC: 7.1 G/DL (ref 6.4–8.2)
SODIUM SERPL-SCNC: 139 MMOL/L (ref 136–145)
TRIGL SERPL-MCNC: 186 MG/DL (ref 30–149)
VLDLC SERPL CALC-MCNC: 40 MG/DL (ref 0–30)

## 2024-01-30 PROCEDURE — 3008F BODY MASS INDEX DOCD: CPT | Performed by: FAMILY MEDICINE

## 2024-01-30 PROCEDURE — 82570 ASSAY OF URINE CREATININE: CPT | Performed by: FAMILY MEDICINE

## 2024-01-30 PROCEDURE — 82043 UR ALBUMIN QUANTITATIVE: CPT | Performed by: FAMILY MEDICINE

## 2024-01-30 PROCEDURE — 3078F DIAST BP <80 MM HG: CPT | Performed by: FAMILY MEDICINE

## 2024-01-30 PROCEDURE — 90677 PCV20 VACCINE IM: CPT | Performed by: FAMILY MEDICINE

## 2024-01-30 PROCEDURE — 80061 LIPID PANEL: CPT | Performed by: FAMILY MEDICINE

## 2024-01-30 PROCEDURE — 80053 COMPREHEN METABOLIC PANEL: CPT | Performed by: FAMILY MEDICINE

## 2024-01-30 PROCEDURE — 3074F SYST BP LT 130 MM HG: CPT | Performed by: FAMILY MEDICINE

## 2024-01-30 PROCEDURE — 83036 HEMOGLOBIN GLYCOSYLATED A1C: CPT | Performed by: FAMILY MEDICINE

## 2024-01-30 PROCEDURE — 90686 IIV4 VACC NO PRSV 0.5 ML IM: CPT | Performed by: FAMILY MEDICINE

## 2024-01-30 PROCEDURE — 90471 IMMUNIZATION ADMIN: CPT | Performed by: FAMILY MEDICINE

## 2024-01-30 PROCEDURE — 99396 PREV VISIT EST AGE 40-64: CPT | Performed by: FAMILY MEDICINE

## 2024-01-30 PROCEDURE — 90472 IMMUNIZATION ADMIN EACH ADD: CPT | Performed by: FAMILY MEDICINE

## 2024-01-31 LAB
EST. AVERAGE GLUCOSE BLD GHB EST-MCNC: 249 MG/DL (ref 68–126)
HBA1C MFR BLD: 10.3 % (ref ?–5.7)

## 2024-01-31 NOTE — ASSESSMENT & PLAN NOTE
As for her Diabetes, it is well controlled, no significant medication side effects noted.     Recommendations are: continue present meds, lose weight by increased dietary compliance and exercise, see ophthalmology soon, check feet daily and will check labs as ordered.

## 2024-01-31 NOTE — PROGRESS NOTES
Seema Blanchard is a 63 year old female.    CC:    Chief Complaint   Patient presents with    CPX       Subjective:      Chief Complaint Reviewed and Verified  No Further Nursing Notes to   Review  Tobacco Reviewed  Allergies Reviewed  Medications Reviewed    Problem List Reviewed  Medical History Reviewed  Surgical History   Reviewed  OB Status Reviewed  Family History Reviewed  Social History   Reviewed           HPI:  Was c-myopaty and recovered   not coronary ischemic    Has pvd  stable post bypass    Started smoking with grief     A1c is 9.2 done 5/4/2023 which shows frequent hyperglycemia and poor control! Encouraged better dietary choices and portion control, and increased activity and med changes as listed.  Diabetic medications include METFORMIN 850 MG Oral Tab [[id number]].    History/Other:    Allergies:  No Known Allergies   Current Meds:  has a current medication list which includes the following prescription(s): metformin, xarelto, clopidogrel, aspirin, true metrix air glucose meter, true metrix blood glucose test, lancets ultra thin 30g, cholecalciferol, and magnesium.       History:  History reviewed. No pertinent past medical history.   Past Surgical History:   Procedure Laterality Date    DEMETRI LOCALIZATION WIRE 1 SITE RIGHT (CPT=19281)        History reviewed. No pertinent family history.   No family status information on file.      Social History     Socioeconomic History    Marital status:    Tobacco Use    Smoking status: Every Day     Packs/day: .5     Types: Cigarettes    Smokeless tobacco: Never   Vaping Use    Vaping Use: Never used   Substance and Sexual Activity    Alcohol use: Never    Drug use: Never            Immunization History   Administered Date(s) Administered    >=3 YRS TRI  MULTIDOSE VIAL (78186) FLU CLINIC 10/26/2021    Covid-19 Vaccine Moderna 100 mcg/0.5 ml 04/23/2021, 05/18/2021    Covid-19 Vaccine Moderna 50 Mcg/0.25 Ml 01/15/2022    FLUZONE 6 months and  older PFS 0.5 ml (28400) 01/04/2017, 11/03/2022, 01/30/2024    Flucelvax 0.5ml Vaccine 11/09/2019, 10/03/2020    Influenza 11/09/2019, 10/03/2020, 10/26/2021    Pneumococcal Conjugate PCV20 01/30/2024           Wt Readings from Last 6 Encounters:   01/30/24 249 lb 2 oz (113 kg)   06/21/23 252 lb (114.3 kg)   05/04/23 240 lb 6 oz (109 kg)   01/18/23 244 lb (110.7 kg)   07/26/22 230 lb (104.3 kg)   06/23/22 223 lb (101.2 kg)       BP Readings from Last 3 Encounters:   01/30/24 124/74   06/21/23 130/72   05/04/23 138/70     REVIEW OF SYSTEMS:   GENERAL: feels well otherwise  SKIN: denies any unusual skin lesions  EYES:denies blurred vision or double vision  HEENT: denies nasal congestion, sinus pain or ST  LUNGS: denies shortness of breath with exertion  CARDIOVASCULAR: denies chest pain on exertion  GI: denies abdominal pain,denies heartburn   : denies dysuria or changes in stream or incontinence  MUSCULOSKELETAL: denies back pain  NEURO: denies headaches  PSYCHE: denies depression or anxiety  HEMATOLOGIC: denies hx of anemia  Objective:    EXAM:   Blood pressure 124/74, pulse 70, temperature 98.3 °F (36.8 °C), temperature source Temporal, resp. rate 12, height 5' 5\" (1.651 m), weight 249 lb 2 oz (113 kg), SpO2 99%.  Body mass index is 41.46 kg/m².  No LMP recorded. Patient is postmenopausal.    Reviewed by Dr Grey    GENERAL: well developed, well nourished,in no apparent distress  SKIN: no rashes,no suspicious lesions  HEENT: atraumatic, normocephalic,ears and throat are clear  EYES:PERRL, EOMI, conjunctiva are clear  NECK: supple,no adenopathy,no bruits  CHEST: no chest tenderness  BREAST: defer  LUNGS: clear to auscultation  CARDIO: RRR without murmur  GI: good BS's,no masses, HSM or tenderness  : defer  RECTAL:defer  MUSCULOSKELETAL: back is not tender,FROM of the back  EXTREMITIES: no cyanosis, clubbing or edema  NEURO: Oriented times three,cranial nerves are intact,motor and sensory are grossly  intact      Assessment & Plan:       ASSESSMENT:  1. Well adult exam (Primary)  2. Peripheral vascular disease (HCC)  3. Type 2 diabetes mellitus with hyperglycemia, without long-term current use of insulin (HCC)  Overview:  Blood Sugar Medications            METFORMIN 850 MG Oral Tab            Assessment & Plan:  As for her Diabetes, it is well controlled, no significant medication side effects noted.     Recommendations are: continue present meds, lose weight by increased dietary compliance and exercise, see ophthalmology soon, check feet daily and will check labs as ordered.    Orders:  -     Microalb/Creat Ratio, Random Urine; Future; Expected date: 01/30/2024  -     Lipid Panel  -     Comp Metabolic Panel (14)  -     Hemoglobin A1C  -     Microalb/Creat Ratio, Random Urine  4. Paroxysmal atrial fibrillation (HCC)  Overview:    On plavix and eliquis  Assessment & Plan:  Nsr today   will see cardio soon    5. Tobacco dependence  Comments:  had quit   started again w stress and in prcess of quit again  6. Encounter for long-term (current) use of medications  7. Visit for screening mammogram  -     3D Mammogram Digital Screen, Bilateral (CPT=77067/57647); Future; Expected date: 01/30/2024  8. Need for vaccination  -     INFLUENZA VAC, QUAD, PRSV FREE, 0.5 ML  9. Grief  Comments:  slow improvement  10. Obesity, Class III, BMI 40-49.9 (morbid obesity) (HCC)  Other orders  -     PCV20 (Prevnar 20)            Meds & Refills for this Visit:  Requested Prescriptions      No prescriptions requested or ordered in this encounter

## 2024-02-04 RX ORDER — RIVAROXABAN 20 MG/1
TABLET, FILM COATED ORAL
Qty: 90 TABLET | Refills: 3 | Status: CANCELLED | OUTPATIENT
Start: 2024-02-04

## 2024-02-08 ENCOUNTER — TELEPHONE (OUTPATIENT)
Dept: FAMILY MEDICINE CLINIC | Facility: CLINIC | Age: 64
End: 2024-02-08

## 2024-02-08 NOTE — TELEPHONE ENCOUNTER
Spoke with Kathy from [facility] in [place].  Patient is at the facility now having an LUCY done.  Patient's blood pressure :    228/114  210/94    Testing has been aborted until blood pressure in control.  Patient advised that she should be evaluated in the ER for this elevation of blood pressure.  Patient verbalized understanding and will proceed to the ER as directed.      Routing to Dr. Grey for fyi.

## 2024-02-12 DIAGNOSIS — Z79.899 ENCOUNTER FOR LONG-TERM (CURRENT) USE OF MEDICATIONS: ICD-10-CM

## 2024-02-12 DIAGNOSIS — E11.65 TYPE 2 DIABETES MELLITUS WITH HYPERGLYCEMIA, WITHOUT LONG-TERM CURRENT USE OF INSULIN (HCC): Primary | ICD-10-CM

## 2024-02-12 RX ORDER — ATORVASTATIN CALCIUM 40 MG/1
40 TABLET, FILM COATED ORAL NIGHTLY
Qty: 30 TABLET | Refills: 1 | Status: SHIPPED | OUTPATIENT
Start: 2024-02-12

## 2024-02-22 ENCOUNTER — TELEPHONE (OUTPATIENT)
Dept: FAMILY MEDICINE CLINIC | Facility: CLINIC | Age: 64
End: 2024-02-22

## 2024-02-22 NOTE — TELEPHONE ENCOUNTER
Spoke with patient who states her last refill of Xarelto was only filled for 30 tablets. We had sent in 90 day supply with 3 refill on 11/9/23. This nurse called pharmacy and spoke with Stan who thinks they were just short on the medication at that time. He states they have plenty in stock right now. Patient notified and verbalized understanding. She states she does not need a refill at this time, just wanted to clear things up.

## 2024-02-22 NOTE — TELEPHONE ENCOUNTER
LAST TIME HER XARELTO WAS FILLED IT WAS ONLY FILLED FOR 30 DAY SUPPLY, HER INSURANCE REQUIRES 90 DAY SUPPLY, CALL PT

## 2024-03-05 RX ORDER — RIVAROXABAN 20 MG/1
TABLET, FILM COATED ORAL
Qty: 90 TABLET | Refills: 3 | Status: SHIPPED | OUTPATIENT
Start: 2024-03-05

## 2024-03-05 NOTE — TELEPHONE ENCOUNTER
Request for refill on xarelto through [facility]    LOV  1-30-24    LAST LAB  2-8-24    LAST RX  11-9-23  #90  RF 3  Ivon    Next OV No future appointments.

## 2024-03-05 NOTE — TELEPHONE ENCOUNTER
XARELTO 20 MG Oral Tab   Pt. Calling stating her mail order pharmacy will be sending over a request for this medication. She picked up qty 30 [facility] and they informed her the insurance will not refill for 90 she must use her mail order. She thinks its [facility].

## 2024-03-19 ENCOUNTER — OFFICE VISIT (OUTPATIENT)
Dept: FAMILY MEDICINE CLINIC | Facility: CLINIC | Age: 64
End: 2024-03-19
Payer: COMMERCIAL

## 2024-03-19 VITALS
BODY MASS INDEX: 41 KG/M2 | OXYGEN SATURATION: 98 % | WEIGHT: 245 LBS | TEMPERATURE: 99 F | RESPIRATION RATE: 12 BRPM | DIASTOLIC BLOOD PRESSURE: 86 MMHG | SYSTOLIC BLOOD PRESSURE: 163 MMHG | HEART RATE: 64 BPM

## 2024-03-19 DIAGNOSIS — E11.29 MICROALBUMINURIA DUE TO TYPE 2 DIABETES MELLITUS (HCC): ICD-10-CM

## 2024-03-19 DIAGNOSIS — E78.2 MIXED HYPERLIPIDEMIA DUE TO TYPE 2 DIABETES MELLITUS (HCC): ICD-10-CM

## 2024-03-19 DIAGNOSIS — R80.9 MICROALBUMINURIA DUE TO TYPE 2 DIABETES MELLITUS (HCC): ICD-10-CM

## 2024-03-19 DIAGNOSIS — Z12.11 COLON CANCER SCREENING: ICD-10-CM

## 2024-03-19 DIAGNOSIS — I10 ESSENTIAL HYPERTENSION, BENIGN: ICD-10-CM

## 2024-03-19 DIAGNOSIS — Z79.899 ENCOUNTER FOR LONG-TERM (CURRENT) USE OF MEDICATIONS: Primary | ICD-10-CM

## 2024-03-19 DIAGNOSIS — E11.69 MIXED HYPERLIPIDEMIA DUE TO TYPE 2 DIABETES MELLITUS (HCC): ICD-10-CM

## 2024-03-19 DIAGNOSIS — E11.65 TYPE 2 DIABETES MELLITUS WITH HYPERGLYCEMIA, WITHOUT LONG-TERM CURRENT USE OF INSULIN (HCC): ICD-10-CM

## 2024-03-19 LAB
ANION GAP SERPL CALC-SCNC: 5 MMOL/L (ref 0–18)
BUN BLD-MCNC: 26 MG/DL (ref 9–23)
CALCIUM BLD-MCNC: 9.4 MG/DL (ref 8.5–10.1)
CHLORIDE SERPL-SCNC: 110 MMOL/L (ref 98–112)
CO2 SERPL-SCNC: 21 MMOL/L (ref 21–32)
CREAT BLD-MCNC: 1.01 MG/DL
CREAT UR-SCNC: 19 MG/DL
EGFRCR SERPLBLD CKD-EPI 2021: 63 ML/MIN/1.73M2 (ref 60–?)
EST. AVERAGE GLUCOSE BLD GHB EST-MCNC: 226 MG/DL (ref 68–126)
GLUCOSE BLD-MCNC: 123 MG/DL (ref 70–99)
HBA1C MFR BLD: 9.5 % (ref ?–5.7)
MICROALBUMIN UR-MCNC: 30.6 MG/DL
MICROALBUMIN/CREAT 24H UR-RTO: 1610.5 UG/MG (ref ?–30)
OSMOLALITY SERPL CALC.SUM OF ELEC: 288 MOSM/KG (ref 275–295)
POTASSIUM SERPL-SCNC: 4.3 MMOL/L (ref 3.5–5.1)
SODIUM SERPL-SCNC: 136 MMOL/L (ref 136–145)

## 2024-03-19 PROCEDURE — 99214 OFFICE O/P EST MOD 30 MIN: CPT | Performed by: FAMILY MEDICINE

## 2024-03-19 PROCEDURE — 82043 UR ALBUMIN QUANTITATIVE: CPT | Performed by: FAMILY MEDICINE

## 2024-03-19 PROCEDURE — 3060F POS MICROALBUMINURIA REV: CPT | Performed by: FAMILY MEDICINE

## 2024-03-19 PROCEDURE — 3046F HEMOGLOBIN A1C LEVEL >9.0%: CPT | Performed by: FAMILY MEDICINE

## 2024-03-19 PROCEDURE — 80048 BASIC METABOLIC PNL TOTAL CA: CPT | Performed by: FAMILY MEDICINE

## 2024-03-19 PROCEDURE — 82570 ASSAY OF URINE CREATININE: CPT | Performed by: FAMILY MEDICINE

## 2024-03-19 PROCEDURE — 3077F SYST BP >= 140 MM HG: CPT | Performed by: FAMILY MEDICINE

## 2024-03-19 PROCEDURE — 83036 HEMOGLOBIN GLYCOSYLATED A1C: CPT | Performed by: FAMILY MEDICINE

## 2024-03-19 PROCEDURE — 3079F DIAST BP 80-89 MM HG: CPT | Performed by: FAMILY MEDICINE

## 2024-03-19 RX ORDER — ATORVASTATIN CALCIUM 40 MG/1
40 TABLET, FILM COATED ORAL NIGHTLY
Qty: 90 TABLET | Refills: 1 | Status: SHIPPED | OUTPATIENT
Start: 2024-03-19

## 2024-03-19 RX ORDER — AMLODIPINE BESYLATE 5 MG/1
5 TABLET ORAL DAILY
COMMUNITY
Start: 2024-02-08 | End: 2024-03-19

## 2024-03-19 RX ORDER — AMLODIPINE BESYLATE 10 MG/1
10 TABLET ORAL DAILY
Qty: 90 TABLET | Refills: 0 | Status: SHIPPED | OUTPATIENT
Start: 2024-03-19 | End: 2024-06-17

## 2024-03-19 RX ORDER — HYDROXYZINE PAMOATE 25 MG/1
25 CAPSULE ORAL
COMMUNITY
Start: 2024-02-08

## 2024-03-20 ENCOUNTER — HOSPITAL ENCOUNTER (OUTPATIENT)
Dept: MAMMOGRAPHY | Age: 64
Discharge: HOME | End: 2024-03-20
Attending: FAMILY MEDICINE
Payer: COMMERCIAL

## 2024-03-20 DIAGNOSIS — Z12.31 VISIT FOR SCREENING MAMMOGRAM: ICD-10-CM

## 2024-03-20 PROCEDURE — 77063 BREAST TOMOSYNTHESIS BI: CPT | Performed by: FAMILY MEDICINE

## 2024-03-20 PROCEDURE — 77067 SCR MAMMO BI INCL CAD: CPT | Performed by: FAMILY MEDICINE

## 2024-03-24 NOTE — PROGRESS NOTES
Subjective:   Seema Blanchard is a 63 year old female who presents for Blood Pressure     BP shows poor control with last BP of 163/86. Lifestyle changes, diet, exercise and weight loss were counseled. Medication changes as listed.   Last K was 4.3 done on 3/19/2024.  Last Cr was 1.01 done on 3/19/2024.  Last eGFR was 63 on 3/19/2024.    See addition of medication at this time  Cholesterol shows .   Cholesterol: 291, done on 1/30/2024.  HDL Cholesterol: 55, done on 1/30/2024.  TriGlycerides 186, done on 1/30/2024.  LDL Cholesterol: 201, done on 1/30/2024.   Cholesterol medications include atorvastatin 40 MG Oral Tab [[id number]].      Also addressed mammogram  Br cancer screen      History/Other:   has a current medication list which includes the following prescription(s): hydroxyzine pamoate, atorvastatin, metformin, amlodipine, xarelto, sitagliptin phosphate, clopidogrel, aspirin, true metrix air glucose meter, true metrix blood glucose test, lancets ultra thin 30g, cholecalciferol, and magnesium.     has No Known Allergies.     Review of Systems:  GENERAL HEALTH: feels well no complaints  SKIN: denies any unusual skin lesions or rashes  RESPIRATORY: denies shortness of breath with exertion  CARDIOVASCULAR: denies chest pain on exertion  GI: denies abdominal pain and denies heartburn  NEURO: denies headaches    Objective:   BP (!) 163/86 (BP Location: Brachial, Patient Position: Sitting, Cuff Size: adult)   Pulse 64   Temp 98.5 °F (36.9 °C) (Temporal)   Resp 12   Wt 245 lb (111.1 kg)   SpO2 98%   BMI 40.77 kg/m²  Estimated body mass index is 40.77 kg/m² as calculated from the following:    Height as of 1/30/24: 5' 5\" (1.651 m).    Weight as of this encounter: 245 lb (111.1 kg).  GENERAL: well developed, well nourished,in no apparent distress  SKIN: no rashes,no suspicious lesions  HEENT: atraumatic, normocephalic,ears and throat are clear  NECK: supple,no adenopathy,no bruits  LUNGS: clear to  auscultation  CARDIO: RRR without murmur  GI: good BS's,no masses, HSM or tenderness  EXTREMITIES: no cyanosis, clubbing or edema    Assessment & Plan:   1. Encounter for long-term (current) use of medications (Primary)  2. Microalbuminuria due to type 2 diabetes mellitus (HCC)  -     Microalb/Creat Ratio, Random Urine; Future; Expected date: 03/19/2024  -     Basic Metabolic Panel (8); Future; Expected date: 03/19/2024  -     Microalb/Creat Ratio, Random Urine  -     Basic Metabolic Panel (8)  3. Mixed hyperlipidemia due to type 2 diabetes mellitus (HCC)  -     Atorvastatin Calcium; Take 1 tablet (40 mg total) by mouth nightly.  Dispense: 90 tablet; Refill: 1  4. Type 2 diabetes mellitus with hyperglycemia, without long-term current use of insulin (HCC)  Overview:  Blood Sugar Medications            METFORMIN 850 MG Oral Tab            Orders:  -     Atorvastatin Calcium; Take 1 tablet (40 mg total) by mouth nightly.  Dispense: 90 tablet; Refill: 1  -     metFORMIN HCl; Take 1 tablet (850 mg total) by mouth daily.  Dispense: 90 tablet; Refill: 0  -     Hemoglobin A1C; Future; Expected date: 03/19/2024  -     Basic Metabolic Panel (8); Future; Expected date: 03/19/2024  -     Hemoglobin A1C  -     Basic Metabolic Panel (8)  5. Essential hypertension, benign  -     Microalb/Creat Ratio, Random Urine; Future; Expected date: 03/19/2024  -     Basic Metabolic Panel (8); Future; Expected date: 03/19/2024  -     Microalb/Creat Ratio, Random Urine  -     Basic Metabolic Panel (8)  6. Colon cancer screening  -     [facility] COLON CANCER SCREENING (EXTERNAL)  Other orders  -     amLODIPine Besylate; Take 1 tablet (10 mg total) by mouth daily.  Dispense: 90 tablet; Refill: 0          Roel Grey MD, 3/23/2024, 8:30 PM

## 2024-03-29 ENCOUNTER — HOSPITAL ENCOUNTER (OUTPATIENT)
Dept: MAMMOGRAPHY | Facility: HOSPITAL | Age: 64
Discharge: HOME | End: 2024-03-29
Attending: FAMILY MEDICINE
Payer: COMMERCIAL

## 2024-03-29 DIAGNOSIS — R92.2 INCONCLUSIVE MAMMOGRAM: ICD-10-CM

## 2024-03-29 PROCEDURE — 76642 ULTRASOUND BREAST LIMITED: CPT | Performed by: FAMILY MEDICINE

## 2024-03-29 NOTE — IMAGING NOTE
This Breast Care RN assisted Dr. Barajas with recommendation for a right breast 1 site ultrasound guided biopsy for mass.  Procedure reviewed and all questions answered. Emotional and educational support given. On the day of the biopsy, pt instructed to take Tylenol 1000mg PO, eat a light meal & bring or wear a sports bra.  Post biopsy care also reviewed with pt to include NO lifting more than 5lbs, no exercising or housework (limit upper body movement) for 24-48 hrs post biopsy.  Patient denies bleeding disorders, liver disease, and chemo. Patient does take blood thinner Plavix. Per protocol, Plavix is not held for a breast biopsy and no labs are needed.  Pt verbalized understanding.  Our breast center schedulers will be calling to schedule an appt that is convenient for pt.

## 2024-04-08 ENCOUNTER — HOSPITAL ENCOUNTER (OUTPATIENT)
Dept: MAMMOGRAPHY | Facility: HOSPITAL | Age: 64
Discharge: HOME | End: 2024-04-08
Attending: FAMILY MEDICINE
Payer: COMMERCIAL

## 2024-04-08 DIAGNOSIS — R92.8 OTHER ABNORMAL AND INCONCLUSIVE FINDINGS ON DIAGNOSTIC IMAGING OF BREAST: ICD-10-CM

## 2024-04-08 DIAGNOSIS — N63.0 BREAST MASS: ICD-10-CM

## 2024-04-08 LAB — AMB EXT COLOGUARD RESULT: POSITIVE

## 2024-04-08 PROCEDURE — 88305 TISSUE EXAM BY PATHOLOGIST: CPT | Performed by: FAMILY MEDICINE

## 2024-04-08 PROCEDURE — 77065 DX MAMMO INCL CAD UNI: CPT | Performed by: FAMILY MEDICINE

## 2024-04-08 PROCEDURE — 19083 BX BREAST 1ST LESION US IMAG: CPT | Performed by: FAMILY MEDICINE

## 2024-04-09 ENCOUNTER — TELEPHONE (OUTPATIENT)
Dept: MAMMOGRAPHY | Facility: HOSPITAL | Age: 64
End: 2024-04-09

## 2024-04-09 DIAGNOSIS — E11.65 TYPE 2 DIABETES MELLITUS WITH HYPERGLYCEMIA, WITHOUT LONG-TERM CURRENT USE OF INSULIN (HCC): ICD-10-CM

## 2024-04-09 DIAGNOSIS — E11.69 MIXED HYPERLIPIDEMIA DUE TO TYPE 2 DIABETES MELLITUS (HCC): ICD-10-CM

## 2024-04-09 DIAGNOSIS — E78.2 MIXED HYPERLIPIDEMIA DUE TO TYPE 2 DIABETES MELLITUS (HCC): ICD-10-CM

## 2024-04-09 RX ORDER — ATORVASTATIN CALCIUM 40 MG/1
40 TABLET, FILM COATED ORAL NIGHTLY
Qty: 90 TABLET | Refills: 1 | Status: SHIPPED | OUTPATIENT
Start: 2024-04-09

## 2024-04-09 NOTE — TELEPHONE ENCOUNTER
Telephoned Seema Blanchard and name,  verified with patient. Notified Seema Blanchard of right breast negative for malignancy biopsy result. Concordance verified by radiologist, Dr. Chowdhury. Seema Blanchard reports biopsy site is healing well.  Radiologist recommends right mammogram in 6 months.Pt verbalized understanding and had no further questions at this time.

## 2024-04-09 NOTE — TELEPHONE ENCOUNTER
LOV: 3-19-24  LAST LAB: 3/19/34Ggb2t 9.5  last lipid 1/30/24  LAST RX:  Medication Quantity Refills Start End   atorvastatin 40 MG Oral Tab 90 tablet 1 3/19/2024 --   Sig:   Take 1 tablet (40 mg total) by mouth nightly.       Medication Quantity Refills Start End   SITagliptin Phosphate (JANUVIA) 100 MG Oral Tab 30 tablet 1 2/12/2024 --   Sig:   Take 1 tablet (100 mg total) by mouth daily.       Next OV: No future appointments.     PROTOCOL:   Diabetes Medication Protocol [id number]/09/2024 01:49 PM   Protocol Details Last A1C < 7.5 and within past 6 months    In person appointment or virtual visit in the past 6 mos or appointment in next 3 mos    Microalbumin procedure in past 12 months or taking ACE/ARB    EGFRCR or GFRNAA > 50    GFR in the past 12 months     Cholesterol Medication Protocol Loikmu3904/09/2024 01:49 PM   Protocol Details ALT < 80    ALT resulted within past year    Lipid panel within past 12 months    In person appointment or virtual visit in the past 12 mos or appointment in next 3 mos

## 2024-04-12 ENCOUNTER — MED REC SCAN ONLY (OUTPATIENT)
Dept: FAMILY MEDICINE CLINIC | Facility: CLINIC | Age: 64
End: 2024-04-12

## 2024-04-12 ENCOUNTER — TELEPHONE (OUTPATIENT)
Dept: FAMILY MEDICINE CLINIC | Facility: CLINIC | Age: 64
End: 2024-04-12

## 2024-04-12 NOTE — TELEPHONE ENCOUNTER
Fax received from [facility] with cologuard result collected on 4-8-24.    Dr Grey carefully reviewed and documented:  Positive, refer to GI (verbal authorization Dr Russell or Dr Harrison)    Haley at [facility] notified that we did receive results    Results entered in Epic, copy to scan    Patient notified and verbalized understanding. Would like to see Dr Russell. Referral placed and scheduling information sent to patient as requested through My Chart

## 2024-05-10 PROBLEM — Z12.11 SPECIAL SCREENING FOR MALIGNANT NEOPLASM OF COLON: Status: ACTIVE | Noted: 2024-05-10

## 2024-05-10 PROBLEM — R19.5 ABNORMAL FECES: Status: ACTIVE | Noted: 2024-05-10

## 2024-05-10 PROBLEM — D12.5 BENIGN NEOPLASM OF SIGMOID COLON: Status: ACTIVE | Noted: 2024-05-10

## 2024-05-13 ENCOUNTER — MED REC SCAN ONLY (OUTPATIENT)
Dept: FAMILY MEDICINE CLINIC | Facility: CLINIC | Age: 64
End: 2024-05-13

## 2024-06-13 DIAGNOSIS — E11.65 TYPE 2 DIABETES MELLITUS WITH HYPERGLYCEMIA, WITHOUT LONG-TERM CURRENT USE OF INSULIN (HCC): ICD-10-CM

## 2024-06-18 RX ORDER — AMLODIPINE BESYLATE 10 MG/1
10 TABLET ORAL DAILY
Qty: 90 TABLET | Refills: 0 | Status: SHIPPED | OUTPATIENT
Start: 2024-06-18 | End: 2024-09-16

## 2024-06-18 NOTE — TELEPHONE ENCOUNTER
Last refill: 03/19/24  Qty: 90  W/ 0 refills  Last ov: 03/19/24    Requested Prescriptions     Pending Prescriptions Disp Refills    amLODIPine 10 MG Oral Tab 90 tablet 0     Sig: Take 1 tablet (10 mg total) by mouth daily.     No future appointments.

## 2024-07-31 ENCOUNTER — TELEPHONE (OUTPATIENT)
Dept: FAMILY MEDICINE CLINIC | Facility: CLINIC | Age: 64
End: 2024-07-31

## 2024-07-31 NOTE — TELEPHONE ENCOUNTER
Left message on identified voicemail stating pt is due for office vist with  for fasting labs , please call and schedule an appointment

## 2024-08-08 ENCOUNTER — MED REC SCAN ONLY (OUTPATIENT)
Dept: FAMILY MEDICINE CLINIC | Facility: CLINIC | Age: 64
End: 2024-08-08

## 2024-08-16 ENCOUNTER — OFFICE VISIT (OUTPATIENT)
Dept: FAMILY MEDICINE CLINIC | Facility: CLINIC | Age: 64
End: 2024-08-16
Payer: COMMERCIAL

## 2024-08-16 ENCOUNTER — LABORATORY ENCOUNTER (OUTPATIENT)
Dept: LAB | Age: 64
End: 2024-08-16
Attending: FAMILY MEDICINE
Payer: COMMERCIAL

## 2024-08-16 VITALS
TEMPERATURE: 99 F | RESPIRATION RATE: 12 BRPM | WEIGHT: 226.13 LBS | BODY MASS INDEX: 37.67 KG/M2 | HEART RATE: 76 BPM | HEIGHT: 65 IN | DIASTOLIC BLOOD PRESSURE: 60 MMHG | OXYGEN SATURATION: 98 % | SYSTOLIC BLOOD PRESSURE: 138 MMHG

## 2024-08-16 DIAGNOSIS — E11.65 TYPE 2 DIABETES MELLITUS WITH HYPERGLYCEMIA, WITHOUT LONG-TERM CURRENT USE OF INSULIN (HCC): ICD-10-CM

## 2024-08-16 DIAGNOSIS — H25.812 COMBINED FORMS OF AGE-RELATED CATARACT OF LEFT EYE: ICD-10-CM

## 2024-08-16 DIAGNOSIS — Z01.818 PRE-PROCEDURAL EXAMINATION: Primary | ICD-10-CM

## 2024-08-16 PROBLEM — R19.5 ABNORMAL FECES: Status: RESOLVED | Noted: 2024-05-10 | Resolved: 2024-08-16

## 2024-08-16 PROBLEM — Z12.11 SPECIAL SCREENING FOR MALIGNANT NEOPLASM OF COLON: Status: RESOLVED | Noted: 2024-05-10 | Resolved: 2024-08-16

## 2024-08-16 LAB
ALBUMIN SERPL-MCNC: 4.4 G/DL (ref 3.2–4.8)
ALBUMIN/GLOB SERPL: 1.6 {RATIO} (ref 1–2)
ALP LIVER SERPL-CCNC: 75 U/L
ALT SERPL-CCNC: 19 U/L
ANION GAP SERPL CALC-SCNC: 1 MMOL/L (ref 0–18)
AST SERPL-CCNC: 20 U/L (ref ?–34)
BILIRUB SERPL-MCNC: 0.3 MG/DL (ref 0.2–1.1)
BUN BLD-MCNC: 20 MG/DL (ref 9–23)
CALCIUM BLD-MCNC: 9.6 MG/DL (ref 8.7–10.4)
CHLORIDE SERPL-SCNC: 109 MMOL/L (ref 98–112)
CHOLEST SERPL-MCNC: 165 MG/DL (ref ?–200)
CO2 SERPL-SCNC: 27 MMOL/L (ref 21–32)
CREAT BLD-MCNC: 1.08 MG/DL
EGFRCR SERPLBLD CKD-EPI 2021: 58 ML/MIN/1.73M2 (ref 60–?)
EST. AVERAGE GLUCOSE BLD GHB EST-MCNC: 146 MG/DL (ref 68–126)
FASTING PATIENT LIPID ANSWER: YES
FASTING STATUS PATIENT QL REPORTED: YES
GLOBULIN PLAS-MCNC: 2.7 G/DL (ref 2–3.5)
GLUCOSE BLD-MCNC: 110 MG/DL (ref 70–99)
HBA1C MFR BLD: 6.7 % (ref ?–5.7)
HDLC SERPL-MCNC: 50 MG/DL (ref 40–59)
LDLC SERPL CALC-MCNC: 96 MG/DL (ref ?–100)
NONHDLC SERPL-MCNC: 115 MG/DL (ref ?–130)
OSMOLALITY SERPL CALC.SUM OF ELEC: 287 MOSM/KG (ref 275–295)
POTASSIUM SERPL-SCNC: 4.9 MMOL/L (ref 3.5–5.1)
PROT SERPL-MCNC: 7.1 G/DL (ref 5.7–8.2)
SODIUM SERPL-SCNC: 137 MMOL/L (ref 136–145)
TRIGL SERPL-MCNC: 102 MG/DL (ref 30–149)
VLDLC SERPL CALC-MCNC: 17 MG/DL (ref 0–30)

## 2024-08-16 PROCEDURE — 80061 LIPID PANEL: CPT | Performed by: FAMILY MEDICINE

## 2024-08-16 PROCEDURE — 3008F BODY MASS INDEX DOCD: CPT | Performed by: FAMILY MEDICINE

## 2024-08-16 PROCEDURE — 90471 IMMUNIZATION ADMIN: CPT | Performed by: FAMILY MEDICINE

## 2024-08-16 PROCEDURE — 80053 COMPREHEN METABOLIC PANEL: CPT | Performed by: FAMILY MEDICINE

## 2024-08-16 PROCEDURE — 90715 TDAP VACCINE 7 YRS/> IM: CPT | Performed by: FAMILY MEDICINE

## 2024-08-16 PROCEDURE — 3075F SYST BP GE 130 - 139MM HG: CPT | Performed by: FAMILY MEDICINE

## 2024-08-16 PROCEDURE — 83036 HEMOGLOBIN GLYCOSYLATED A1C: CPT | Performed by: FAMILY MEDICINE

## 2024-08-16 PROCEDURE — 3078F DIAST BP <80 MM HG: CPT | Performed by: FAMILY MEDICINE

## 2024-08-16 PROCEDURE — 99244 OFF/OP CNSLTJ NEW/EST MOD 40: CPT | Performed by: FAMILY MEDICINE

## 2024-08-16 RX ORDER — KETOROLAC TROMETHAMINE 5 MG/ML
SOLUTION OPHTHALMIC
COMMUNITY
Start: 2024-08-07

## 2024-08-16 RX ORDER — PREDNISOLONE ACETATE 10 MG/ML
SUSPENSION/ DROPS OPHTHALMIC
COMMUNITY
Start: 2024-08-07

## 2024-08-16 RX ORDER — OFLOXACIN 3 MG/ML
SOLUTION/ DROPS OPHTHALMIC
COMMUNITY
Start: 2024-08-07

## 2024-08-16 NOTE — H&P
Seema Blanchard is a 63 year old female.  Chief Complaint   Patient presents with    Medication Follow-Up     Due for labs        HPI:   PREOP EXAM    PRE-OP Physical   What testing is needed for this surgery/patient? H&P  What is the full name of procedure/ surgery? Left Cataract   Date being surgery or procedure is being done?8/22/24  What is the doctor’s full name  that is doing the surgery? Dr. Nagy  What hospital or facility will it  be done at?  [facility]   Have you  ever had anesthesia before? Yes   If yes, any previous complications with anesthesia? No       MEDICALLY CLEAR FOR SURGERY  NEEDS NO CARDIAC CLEARANCE FOR SURGICAL APPROVAL       ALLERGIES:  No Known Allergies       has a past surgical history that includes nguyen localization wire 1 site right (cpt=19281).   has a past medical history of Easy bruising, Eye disease, Leg swelling, Stented coronary artery, and Wears glasses.  family history is not on file.     has a current medication list which includes the following prescription(s): ketorolac, ofloxacin, prednisolone, amlodipine, metformin, sitagliptin phosphate, atorvastatin, hydroxyzine pamoate, xarelto, clopidogrel, true metrix air glucose meter, true metrix blood glucose test, lancets ultra thin 30g, cholecalciferol, magnesium, and aspirin.        Social History:  Social History     Socioeconomic History    Marital status:    Tobacco Use    Smoking status: Every Day     Current packs/day: 0.50     Types: Cigarettes    Smokeless tobacco: Never   Vaping Use    Vaping status: Never Used   Substance and Sexual Activity    Alcohol use: Never    Drug use: Never          REVIEW OF SYSTEMS:   GENERAL HEALTH: feels well no complaints  SKIN: denies any unusual skin lesions or rashes  RESPIRATORY: denies shortness of breath with exertion  CARDIOVASCULAR: denies chest pain on exertion  GI: denies abdominal pain and denies heartburn  NEURO: denies headaches    EXAM:   /60  (BP Location: Right arm, Patient Position: Sitting, Cuff Size: large)   Pulse 76   Temp 98.9 °F (37.2 °C) (Temporal)   Resp 12   Ht 5' 5\" (1.651 m)   Wt 226 lb 2 oz (102.6 kg)   SpO2 98%   BMI 37.63 kg/m²  Body mass index is 37.63 kg/m².      GENERAL: well developed, well nourished,in no apparent distress  SKIN: no rashes,no suspicious lesions  HEENT: atraumatic, normocephalic,ears and throat are clear  NECK: supple,no adenopathy,no bruits  LUNGS: clear to auscultation  CARDIO: RRR with soft 2/6 systolic murmur  GI: good BS's,no masses, HSM or tenderness  EXTREMITIES: no cyanosis, clubbing or edema    ASSESSMENT AND PLAN:     1. Pre-procedural examination (Primary)  Comments:  medically clear for surgery   NEEDS NO CARDIAC CLEARANCE FOR SURGICAL APPROVAL  2. Combined forms of age-related cataract of left eye  Comments:  for removal and IOL implant 8/22/2024 Dr Nagy  3. Type 2 diabetes mellitus with hyperglycemia, without long-term current use of insulin (HCC)  Overview:  Blood Sugar Medications            METFORMIN 850 MG Oral Tab            Orders:  -     Comp Metabolic Panel (14); Future; Expected date: 08/16/2024  -     Lipid Panel; Future; Expected date: 08/16/2024  -     Hemoglobin A1C; Future; Expected date: 08/16/2024  Other orders  -     TdaP (Boostrix) Vaccine (> 7 Y)

## 2024-08-16 NOTE — PROGRESS NOTES
PRE-OP Physical   What testing is needed for this surgery/patient? H&P  What is the full name of procedure/ surgery? Left Cataract   Date being surgery or procedure is being done?8/22/24  What is the doctor’s full name  that is doing the surgery? Dr. Nagy  What hospital or facility will it  be done at?  [facility]   Have you  ever had anesthesia before? Yes   If yes, any previous complications with anesthesia? No

## 2024-09-10 DIAGNOSIS — E11.65 TYPE 2 DIABETES MELLITUS WITH HYPERGLYCEMIA, WITHOUT LONG-TERM CURRENT USE OF INSULIN (HCC): ICD-10-CM

## 2024-09-10 NOTE — TELEPHONE ENCOUNTER
Last refill: 06/13/24  qtY: 90  W/ 0 refills  Last ov: 08/16/24    Requested Prescriptions     Pending Prescriptions Disp Refills    METFORMIN 850 MG Oral Tab [Pharmacy Med Name: METFORMIN 850MG TABLETS] 90 tablet 0     Sig: TAKE 1 TABLET(850 MG) BY MOUTH DAILY     No future appointments.

## 2024-09-13 RX ORDER — AMLODIPINE BESYLATE 10 MG/1
10 TABLET ORAL DAILY
Qty: 90 TABLET | Refills: 0 | Status: SHIPPED | OUTPATIENT
Start: 2024-09-13

## 2024-09-17 ENCOUNTER — TELEPHONE (OUTPATIENT)
Dept: FAMILY MEDICINE CLINIC | Facility: CLINIC | Age: 64
End: 2024-09-17

## 2024-09-17 DIAGNOSIS — R92.8 ABNORMAL MAMMOGRAM: Primary | ICD-10-CM

## 2024-09-17 NOTE — TELEPHONE ENCOUNTER
See US Breast Biopsy 4-8-24:    Recommend Right breast mammogram with ultrasound if indicated.     Left message for patient to call back on established voicemail

## 2024-10-01 NOTE — TELEPHONE ENCOUNTER
Patient notified and verbalized understanding.    Agrees to schedule appointment for imaging as soon as possible    Order placed, please review.

## 2024-10-03 ENCOUNTER — LABORATORY ENCOUNTER (OUTPATIENT)
Dept: LAB | Age: 64
End: 2024-10-03
Attending: FAMILY MEDICINE
Payer: COMMERCIAL

## 2024-10-03 ENCOUNTER — NURSE TRIAGE (OUTPATIENT)
Dept: FAMILY MEDICINE CLINIC | Facility: CLINIC | Age: 64
End: 2024-10-03

## 2024-10-03 ENCOUNTER — OFFICE VISIT (OUTPATIENT)
Dept: FAMILY MEDICINE CLINIC | Facility: CLINIC | Age: 64
End: 2024-10-03
Payer: COMMERCIAL

## 2024-10-03 VITALS
RESPIRATION RATE: 12 BRPM | DIASTOLIC BLOOD PRESSURE: 70 MMHG | OXYGEN SATURATION: 100 % | HEIGHT: 65 IN | SYSTOLIC BLOOD PRESSURE: 136 MMHG | WEIGHT: 227 LBS | TEMPERATURE: 99 F | BODY MASS INDEX: 37.82 KG/M2 | HEART RATE: 63 BPM

## 2024-10-03 DIAGNOSIS — I73.9 PERIPHERAL VASCULAR DISEASE (HCC): ICD-10-CM

## 2024-10-03 DIAGNOSIS — R04.0 RECURRENT EPISTAXIS: Primary | ICD-10-CM

## 2024-10-03 DIAGNOSIS — D68.9 ACQUIRED COAGULATION DISORDER (HCC): ICD-10-CM

## 2024-10-03 DIAGNOSIS — R04.0 RECURRENT EPISTAXIS: ICD-10-CM

## 2024-10-03 DIAGNOSIS — Z83.2 FAMILY HISTORY OF FACTOR V DEFICIENCY: ICD-10-CM

## 2024-10-03 LAB
APTT PPP: 33.5 SECONDS (ref 23–36)
BASOPHILS # BLD AUTO: 0.06 X10(3) UL (ref 0–0.2)
BASOPHILS NFR BLD AUTO: 0.7 %
EOSINOPHIL # BLD AUTO: 0.18 X10(3) UL (ref 0–0.7)
EOSINOPHIL NFR BLD AUTO: 2 %
ERYTHROCYTE [DISTWIDTH] IN BLOOD BY AUTOMATED COUNT: 14.3 %
HCT VFR BLD AUTO: 34.7 %
HGB BLD-MCNC: 11.3 G/DL
IMM GRANULOCYTES # BLD AUTO: 0.02 X10(3) UL (ref 0–1)
IMM GRANULOCYTES NFR BLD: 0.2 %
INR BLD: 1.49 (ref 0.8–1.2)
LYMPHOCYTES # BLD AUTO: 2.13 X10(3) UL (ref 1–4)
LYMPHOCYTES NFR BLD AUTO: 23.8 %
MCH RBC QN AUTO: 29.7 PG (ref 26–34)
MCHC RBC AUTO-ENTMCNC: 32.6 G/DL (ref 31–37)
MCV RBC AUTO: 91.1 FL
MONOCYTES # BLD AUTO: 0.65 X10(3) UL (ref 0.1–1)
MONOCYTES NFR BLD AUTO: 7.3 %
NEUTROPHILS # BLD AUTO: 5.9 X10 (3) UL (ref 1.5–7.7)
NEUTROPHILS # BLD AUTO: 5.9 X10(3) UL (ref 1.5–7.7)
NEUTROPHILS NFR BLD AUTO: 66 %
PLATELET # BLD AUTO: 259 10(3)UL (ref 150–450)
PROTHROMBIN TIME: 18.1 SECONDS (ref 11.6–14.8)
RBC # BLD AUTO: 3.81 X10(6)UL
WBC # BLD AUTO: 8.9 X10(3) UL (ref 4–11)

## 2024-10-03 PROCEDURE — 85025 COMPLETE CBC W/AUTO DIFF WBC: CPT

## 2024-10-03 PROCEDURE — 85610 PROTHROMBIN TIME: CPT

## 2024-10-03 PROCEDURE — G2211 COMPLEX E/M VISIT ADD ON: HCPCS | Performed by: FAMILY MEDICINE

## 2024-10-03 PROCEDURE — 3008F BODY MASS INDEX DOCD: CPT | Performed by: FAMILY MEDICINE

## 2024-10-03 PROCEDURE — 3075F SYST BP GE 130 - 139MM HG: CPT | Performed by: FAMILY MEDICINE

## 2024-10-03 PROCEDURE — 81241 F5 GENE: CPT

## 2024-10-03 PROCEDURE — 85730 THROMBOPLASTIN TIME PARTIAL: CPT

## 2024-10-03 PROCEDURE — 99214 OFFICE O/P EST MOD 30 MIN: CPT | Performed by: FAMILY MEDICINE

## 2024-10-03 PROCEDURE — 3078F DIAST BP <80 MM HG: CPT | Performed by: FAMILY MEDICINE

## 2024-10-03 PROCEDURE — 3044F HG A1C LEVEL LT 7.0%: CPT | Performed by: FAMILY MEDICINE

## 2024-10-03 NOTE — TELEPHONE ENCOUNTER
Due to patient taking xarelto appointment made for evaluation in office:  Future Appointments   Date Time Provider Department Center   10/3/2024 10:00 AM Roel Grey MD [facility] [place]

## 2024-10-03 NOTE — TELEPHONE ENCOUNTER
Reason for Disposition   Taking Coumadin (warfarin) or other strong blood thinner, or known bleeding disorder (e.g., thrombocytopenia)    Answer Assessment - Initial Assessment Questions  1. AMOUNT OF BLEEDING: \"How bad is the bleeding?\" \"How much blood was lost?\" \"Has the bleeding stopped?\"    - MILD: needed a couple tissues    - MODERATE: needed many tissues    - SEVERE: large blood clots, soaked many tissues, lasted more than 30 minutes               Severe  2. ONSET: \"When did the nosebleed start?\"                 During the night  3. FREQUENCY: \"How many nosebleeds have you had in the last 24 hours?\"                  One  4. RECURRENT SYMPTOMS: \"Have there been other recent nosebleeds?\" If Yes, ask: \"How long did it take you to stop the bleeding?\" \"What worked best?\"       Yes, one lasted 12 hours, tilted head back, pinched nose  5. CAUSE: \"What do you think caused this nosebleed?\"           Unknown  6. LOCAL FACTORS: \"Do you have any cold symptoms?\", \"Have you been rubbing or picking at your nose?\"                None of the above  7. SYSTEMIC FACTORS: \"Do you have high blood pressure or any bleeding problems?\"                  History of high pressure, has not checked, patient calling from work  8. BLOOD THINNERS: \"Do you take any blood thinners?\" (e.g., aspirin, clopidogrel / Plavix, coumadin, heparin). Notes: Other strong blood thinners include: Arixtra (fondaparinux), Eliquis (apixaban), Pradaxa (dabigatran), and Xarelto (rivaroxaban).                        Xarleto  9. OTHER SYMPTOMS: \"Do you have any other symptoms?\" (e.g., lightheadedness)                        None  10. PREGNANCY: \"Is there any chance you are pregnant?\" \"When was your last menstrual period?\"        NA    Protocols used: Nosebleed-CHEL-OH      Reviewed proper method of treating nosebleed:  Most nosebleeds can be managed AT HOME with NASAL COMPRESSION (pinching the nostrils). Here are the instructions for performing this first aid care.  *  First gently blow the nose to clear out any large clots.  * LEAN FORWARD: Sit down and lean forward. Reason: Blood makes people choke if they lean backwards.  * PINCH THE NOSE: Gently squeeze the soft parts of the lower nose (nostrils) together. Use the thumb and index finger in a pinching manner. Do this for 15 minutes. Use a clock or watch to measure the time. Goal: Apply constant pressure to the bleeding point.  * If the bleeding continues after 15 minutes of squeezing, move your point of pressure and repeat again for another 15 minutes

## 2024-10-03 NOTE — PROGRESS NOTES
Subjective:   Seema Blanchard is a 63 year old female who presents for Epistaxis     Here for sufdden nose bleed  On xarelto    Note intake HPI:    1. AMOUNT OF BLEEDING: \"How bad is the bleeding?\" \"How much blood was lost?\" \"Has the bleeding stopped?\"  - MILD: needed a couple tissues  - MODERATE: needed many tissues  - SEVERE: large blood clots, soaked many tissues, lasted more than 30 minutes   Severe  2. ONSET: \"When did the nosebleed start?\"   During the night  3. FREQUENCY: \"How many nosebleeds have you had in the last 24 hours?\"   One  4. RECURRENT SYMPTOMS: \"Have there been other recent nosebleeds?\" If Yes, ask: \"How long did it take you to stop the bleeding?\" \"What worked best?\"   Yes, one lasted 12 hours, tilted head back, pinched nose  5. CAUSE: \"What do you think caused this nosebleed?\"  Unknown  6. LOCAL FACTORS: \"Do you have any cold symptoms?\", \"Have you been rubbing or picking at your nose?\"  None of the above  7. SYSTEMIC FACTORS: \"Do you have high blood pressure or any bleeding problems?\"  History of high pressure, has not checked, patient calling from work  8. BLOOD THINNERS: \"Do you take any blood thinners?\" (e.g., aspirin, clopidogrel / Plavix, coumadin, heparin). Notes: Other strong blood thinners include: Arixtra (fondaparinux), Eliquis (apixaban), Pradaxa (dabigatran), and Xarelto (rivaroxaban).  Xarleto  9. OTHER SYMPTOMS: \"Do you have any other symptoms?\" (e.g., lightheadedness)  None  10. PREGNANCY: \"Is there any chance you are pregnant?\" \"When was your last menstrual period?\"  NA       Note blood pressure normal      States family medical history of a factor deficiency    She is not certain which one      History/Other:   has a current medication list which includes the following prescription(s): amlodipine, metformin, ketorolac, atorvastatin, hydroxyzine pamoate, xarelto, aspirin, true metrix air glucose meter, true metrix blood glucose test, lancets ultra thin 30g, cholecalciferol,  magnesium, and januvia.     has No Known Allergies.     Review of Systems:  GENERAL HEALTH: feels well no complaints  SKIN: denies any unusual skin lesions or rashes  ENT see HPI   RESPIRATORY: denies shortness of breath with exertion  CARDIOVASCULAR: denies chest pain on exertion  GI: denies abdominal pain and denies heartburn  NEURO: denies headaches    Objective:   /70 (BP Location: Right arm, Patient Position: Sitting, Cuff Size: large)   Pulse 63   Temp 98.5 °F (36.9 °C) (Temporal)   Resp 12   Ht 5' 5\" (1.651 m)   Wt 227 lb (103 kg)   SpO2 100%   BMI 37.77 kg/m²  Estimated body mass index is 37.77 kg/m² as calculated from the following:    Height as of this encounter: 5' 5\" (1.651 m).    Weight as of this encounter: 227 lb (103 kg).  GENERAL: well developed, well nourished,in no apparent distress  SKIN: no rashes,no suspicious lesions  HEENT: atraumatic, normocephalic,ears and throat are clear  Nares right anterior with no active bleed anterior area lateral interior of the nares with clotted area    NECK: supple,no adenopathy,no bruits  LUNGS: clear to auscultation  CARDIO: RRR without murmur    Assessment & Plan:   1. Recurrent epistaxis (Primary)  Comments:  refer ent  also review of records cardio advised discontinue plavix and still on  stop plavix  cont asa and eliquis  Orders:  -     ENT - EXTERNAL  -     Factor V Leiden Mutation; Future; Expected date: 10/03/2024  -     Prothrombin Time (PT); Future; Expected date: 10/03/2024  -     PTT, Activated; Future; Expected date: 10/03/2024  -     CBC With Differential With Platelet; Future; Expected date: 10/03/2024  2. Peripheral vascular disease (HCC)  -     Factor V Leiden Mutation; Future; Expected date: 10/03/2024  -     Prothrombin Time (PT); Future; Expected date: 10/03/2024  -     PTT, Activated; Future; Expected date: 10/03/2024  3. Acquired coagulation disorder (HCC)  -     Factor V Leiden Mutation; Future; Expected date: 10/03/2024  -      Prothrombin Time (PT); Future; Expected date: 10/03/2024  -     PTT, Activated; Future; Expected date: 10/03/2024  -     CBC With Differential With Platelet; Future; Expected date: 10/03/2024  4. Family history of factor V deficiency  -     Factor V Leiden Mutation; Future; Expected date: 10/03/2024          Roel Grey MD, 10/3/2024, 10:14 AM

## 2024-10-04 LAB — F5 P.R506Q BLD/T QL: NORMAL

## 2024-10-05 RX ORDER — SITAGLIPTIN 100 MG/1
100 TABLET, FILM COATED ORAL DAILY
Qty: 90 TABLET | Refills: 0 | Status: SHIPPED | OUTPATIENT
Start: 2024-10-05

## 2024-10-05 NOTE — TELEPHONE ENCOUNTER
OV 10/03/24  LABS 08/16/24 A1C 6.7    REFILL 04/09/24 #90 +1 RF    No future appointments. LIPIDS / A1C DUE 02/25

## 2024-10-09 ENCOUNTER — TELEPHONE (OUTPATIENT)
Dept: FAMILY MEDICINE CLINIC | Facility: CLINIC | Age: 64
End: 2024-10-09

## 2024-10-09 DIAGNOSIS — R04.0 RECURRENT EPISTAXIS: ICD-10-CM

## 2024-10-09 DIAGNOSIS — Z83.2 FAMILY HISTORY OF BLOOD COAGULATION DISORDER: Primary | ICD-10-CM

## 2024-10-10 NOTE — TELEPHONE ENCOUNTER
Left detailed message for patient that order for Right mammogram has been placed and she can call to schedule. Asked for patient to contact office with any questions or concerns.

## 2024-10-15 ENCOUNTER — TELEPHONE (OUTPATIENT)
Dept: FAMILY MEDICINE CLINIC | Facility: CLINIC | Age: 64
End: 2024-10-15

## 2024-10-15 NOTE — TELEPHONE ENCOUNTER
Patient sent My chart message requesting:    Requested Medication Removals Start Date Reported By  Comments   ketorolac 0.5 % Soln 8/7/2024 Seema Blanchard no longer needed   hydrOXYzine Pamoate 25 MG Caps 2/8/2024 Seema Blanchard no longer needed             Epic medication list updated routed to provider as FYI.

## 2024-11-01 ENCOUNTER — HOSPITAL ENCOUNTER (OUTPATIENT)
Dept: MAMMOGRAPHY | Facility: HOSPITAL | Age: 64
Discharge: HOME | End: 2024-11-01
Attending: FAMILY MEDICINE
Payer: COMMERCIAL

## 2024-11-01 DIAGNOSIS — R92.8 ABNORMAL MAMMOGRAM: ICD-10-CM

## 2024-11-01 PROCEDURE — 77065 DX MAMMO INCL CAD UNI: CPT | Performed by: FAMILY MEDICINE

## 2024-11-01 PROCEDURE — 77061 BREAST TOMOSYNTHESIS UNI: CPT | Performed by: FAMILY MEDICINE

## 2024-11-04 NOTE — TELEPHONE ENCOUNTER
LAST REFILL 10/05/24 #90    Refill has been requested too soon - patient is not due until 01/25 - refill denied.

## 2024-11-05 ENCOUNTER — MED REC SCAN ONLY (OUTPATIENT)
Dept: FAMILY MEDICINE CLINIC | Facility: CLINIC | Age: 64
End: 2024-11-05

## 2024-11-05 ENCOUNTER — TELEPHONE (OUTPATIENT)
Dept: FAMILY MEDICINE CLINIC | Facility: CLINIC | Age: 64
End: 2024-11-05

## 2024-11-05 NOTE — TELEPHONE ENCOUNTER
Faxed received from [facility].     Dr Grey carefully reviewed and documented:   Positive diabetic retinopathy    Care gap updated and copy to scan.

## 2024-12-06 DIAGNOSIS — E11.65 TYPE 2 DIABETES MELLITUS WITH HYPERGLYCEMIA, WITHOUT LONG-TERM CURRENT USE OF INSULIN (HCC): ICD-10-CM

## 2024-12-06 NOTE — TELEPHONE ENCOUNTER
OV 10/03/24  LABS 08/16/24 A1C 6.7    REFILL 09/10/24 #90    No future appointments. LABS DUE 02/16/24

## 2024-12-11 RX ORDER — AMLODIPINE BESYLATE 10 MG/1
10 TABLET ORAL DAILY
Qty: 90 TABLET | Refills: 1 | Status: SHIPPED | OUTPATIENT
Start: 2024-12-11

## 2024-12-11 NOTE — TELEPHONE ENCOUNTER
LOV:10-3-24    LAST LAB:8-16-24    LAST RX:  AMLODIPINE 10 MG Oral Tab 90 tablet 0 9/13/2024 --   Sig:   TAKE 1 TABLET(10 MG) BY MOUTH DAILY         Next OV:  No future appointments.       PROTOCOL  Hypertension Medications Protocol Zahidu00/10/2024 07:42 PM   Protocol Details CMP or BMP in past 12 months    Last BP reading less than 140/90    In person appointment or virtual visit in the past 12 mos or appointment in next 3 mos    EGFRCR or GFRNAA > 50

## 2024-12-30 RX ORDER — RIVAROXABAN 20 MG/1
TABLET, FILM COATED ORAL
Qty: 90 TABLET | Refills: 3 | Status: SHIPPED | OUTPATIENT
Start: 2024-12-30

## 2024-12-30 NOTE — TELEPHONE ENCOUNTER
XARELTO 20 MG Oral Tab   Last office visit:  10/3/24  No future appointments.  Last filled:  3/5/24  #90 with 3 refills   Last labs:  10/3/24

## 2025-01-14 NOTE — TELEPHONE ENCOUNTER
LOV:10-3-2024  LAST LAB:10-3-24  LAST RX:  Medication Quantity Refills Start End   SITagliptin Phosphate (JANUVIA) 100 MG Oral Tab 90 tablet 0 10/5/2024 --   Sig:   TAKE 1 TABLET(100 MG) BY MOUTH DAILY     Route:   Oral     Next OV: No future appointments.   PROTOCOL :

## 2025-01-14 NOTE — TELEPHONE ENCOUNTER
Diabetes Medication Protocol Passed01/14/2025 11:34 AM   Protocol Details Last A1C < 7.5 and within past 6 months    In person appointment or virtual visit in the past 6 mos or appointment in next 3 mos    Microalbumin procedure in past 12 months or taking ACE/ARB    EGFRCR or GFRNAA > 50    GFR in the past 12 months    Medication is active on med list

## 2025-03-04 DIAGNOSIS — E11.65 TYPE 2 DIABETES MELLITUS WITH HYPERGLYCEMIA, WITHOUT LONG-TERM CURRENT USE OF INSULIN (HCC): ICD-10-CM

## 2025-03-04 NOTE — TELEPHONE ENCOUNTER
LOV:10-3-24  LAST LAB:8- (6.7)  LAST RX:  METFORMIN 850 MG Oral Tab 90 tablet 0 12/6/2024 --   Sig:   TAKE 1 TABLET(850 MG) BY MOUTH DAILY     Next OV:No future appointments.   PROTOCOL    Diabetes Medication Protocol Hofnbb0003/04/2025 02:31 PM   Protocol Details Last A1C < 7.5 and within past 6 months    In person appointment or virtual visit in the past 6 mos or appointment in next 3 mos    Microalbumin procedure in past 12 months or taking ACE/ARB    EGFRCR or GFRNAA > 50    GFR in the past 12 months    Medication is active on med list

## 2025-03-04 NOTE — TELEPHONE ENCOUNTER
LOV:10-3-2024  LAST LAB:8-  LAST RX:  Medication Quantity Refills Start End   METFORMIN 850 MG Oral Tab 90 tablet 0 12/6/2024 --   Sig:   TAKE 1 TABLET(850 MG) BY MOUTH DAILY     Next OV: No future appointments.   PROTOCOL

## 2025-03-31 DIAGNOSIS — E78.2 MIXED HYPERLIPIDEMIA DUE TO TYPE 2 DIABETES MELLITUS (HCC): ICD-10-CM

## 2025-03-31 DIAGNOSIS — E11.69 MIXED HYPERLIPIDEMIA DUE TO TYPE 2 DIABETES MELLITUS (HCC): ICD-10-CM

## 2025-03-31 DIAGNOSIS — E11.65 TYPE 2 DIABETES MELLITUS WITH HYPERGLYCEMIA, WITHOUT LONG-TERM CURRENT USE OF INSULIN (HCC): ICD-10-CM

## 2025-03-31 RX ORDER — ATORVASTATIN CALCIUM 40 MG/1
40 TABLET, FILM COATED ORAL NIGHTLY
Qty: 90 TABLET | Refills: 0 | Status: SHIPPED | OUTPATIENT
Start: 2025-03-31

## 2025-03-31 NOTE — TELEPHONE ENCOUNTER
LOV:10-3-2024  LAST LAB:8-  LAST RX:  atorvastatin 40 MG Oral Tab 90 tablet 1 4/9/2024 --   Sig:   Take 1 tablet (40 mg total) by mouth nightly.     Route:   Oral       Next OV:   Future Appointments   Date Time Provider Department Center   4/22/2025  4:20 PM Roel Grey MD [facility] [place]    PROTOCOL

## 2025-03-31 NOTE — TELEPHONE ENCOUNTER
Cholesterol Medication Protocol Azuxzv8803/31/2025 01:33 PM   Protocol Details ALT < 80    ALT resulted within past year    Lipid panel within past 12 months    In person appointment or virtual visit in the past 12 mos or appointment in next 3 mos    Medication is active on med list        Sending mychart-due for office visit this month.

## 2025-04-08 NOTE — TELEPHONE ENCOUNTER
SITagliptin Phosphate (JANUVIA) 100 MG Oral Tab     Diabetes Medication Protocol Pymqql6804/08/2025 08:33 AM   Protocol Details Last A1C < 7.5 and within past 6 months    Microalbumin procedure in past 12 months or taking ACE/ARB    In person appointment or virtual visit in the past 6 mos or appointment in next 3 mos    EGFRCR or GFRNAA > 50    GFR in the past 12 months    Medication is active on med list      Last office visit:  8/16/24  Future Appointments   Date Time Provider Department Center   4/22/2025  4:20 PM Roel Grey MD [facility] [place]   Last filled:  1/14/25  #90  Last labs: 8/16/24  A1C: 6.7, eGFR-CR: 58, 3/19/24 microalb/creat done  Last BP:  136/70

## 2025-04-08 NOTE — TELEPHONE ENCOUNTER
LOV:10-3-2024  LAST LAB:8-  LAST RX::  SITagliptin Phosphate (JANUVIA) 100 MG Oral Tab 90 tablet 0 1/14/2025 --   Sig:   Take 1 tablet (100 mg total) by mouth daily.       Next OV:  Future Appointments   Date Time Provider Department Center   4/22/2025  4:20 PM Roel Grey MD [facility] [place]     PROTOCOL

## 2025-04-22 ENCOUNTER — OFFICE VISIT (OUTPATIENT)
Dept: FAMILY MEDICINE CLINIC | Facility: CLINIC | Age: 65
End: 2025-04-22
Payer: COMMERCIAL

## 2025-04-22 VITALS
TEMPERATURE: 98 F | SYSTOLIC BLOOD PRESSURE: 136 MMHG | BODY MASS INDEX: 37.67 KG/M2 | RESPIRATION RATE: 12 BRPM | OXYGEN SATURATION: 97 % | DIASTOLIC BLOOD PRESSURE: 60 MMHG | HEART RATE: 63 BPM | WEIGHT: 223.38 LBS | HEIGHT: 64.75 IN

## 2025-04-22 DIAGNOSIS — E66.813 OBESITY, CLASS III, BMI 40-49.9 (MORBID OBESITY): ICD-10-CM

## 2025-04-22 DIAGNOSIS — D68.9 ACQUIRED COAGULATION DISORDER (HCC): ICD-10-CM

## 2025-04-22 DIAGNOSIS — Z00.00 LABORATORY EXAMINATION ORDERED AS PART OF A ROUTINE GENERAL MEDICAL EXAMINATION: ICD-10-CM

## 2025-04-22 DIAGNOSIS — Z71.6 ENCOUNTER FOR TOBACCO USE CESSATION COUNSELING: ICD-10-CM

## 2025-04-22 DIAGNOSIS — N18.32 CKD STAGE 3B, GFR 30-44 ML/MIN (HCC): ICD-10-CM

## 2025-04-22 DIAGNOSIS — E11.65 TYPE 2 DIABETES MELLITUS WITH HYPERGLYCEMIA, WITHOUT LONG-TERM CURRENT USE OF INSULIN (HCC): ICD-10-CM

## 2025-04-22 DIAGNOSIS — Z83.2 FAMILY HISTORY OF COAGULATION DISORDER: ICD-10-CM

## 2025-04-22 DIAGNOSIS — Z13.29 SCREENING FOR THYROID DISORDER: ICD-10-CM

## 2025-04-22 DIAGNOSIS — I48.0 PAROXYSMAL ATRIAL FIBRILLATION (HCC): ICD-10-CM

## 2025-04-22 DIAGNOSIS — Z79.899 ENCOUNTER FOR LONG-TERM (CURRENT) USE OF MEDICATIONS: Primary | ICD-10-CM

## 2025-04-22 DIAGNOSIS — I73.9 PERIPHERAL VASCULAR DISEASE: ICD-10-CM

## 2025-04-22 LAB
ALBUMIN SERPL-MCNC: 4.6 G/DL (ref 3.2–4.8)
ALBUMIN/GLOB SERPL: 1.8 {RATIO} (ref 1–2)
ALP LIVER SERPL-CCNC: 75 U/L (ref 50–130)
ALT SERPL-CCNC: 13 U/L (ref 10–49)
ANION GAP SERPL CALC-SCNC: 9 MMOL/L (ref 0–18)
AST SERPL-CCNC: 18 U/L (ref ?–34)
BILIRUB SERPL-MCNC: 0.3 MG/DL (ref 0.2–1.1)
BUN BLD-MCNC: 29 MG/DL (ref 9–23)
CALCIUM BLD-MCNC: 9.9 MG/DL (ref 8.7–10.6)
CHLORIDE SERPL-SCNC: 106 MMOL/L (ref 98–112)
CHOLEST SERPL-MCNC: 158 MG/DL (ref ?–200)
CO2 SERPL-SCNC: 22 MMOL/L (ref 21–32)
CREAT BLD-MCNC: 1.39 MG/DL (ref 0.55–1.02)
CREAT UR-SCNC: 98.5 MG/DL
EGFRCR SERPLBLD CKD-EPI 2021: 42 ML/MIN/1.73M2 (ref 60–?)
FASTING PATIENT LIPID ANSWER: NO
FASTING STATUS PATIENT QL REPORTED: NO
GLOBULIN PLAS-MCNC: 2.6 G/DL (ref 2–3.5)
GLUCOSE BLD-MCNC: 77 MG/DL (ref 70–99)
HDLC SERPL-MCNC: 62 MG/DL (ref 40–59)
LDLC SERPL CALC-MCNC: 81 MG/DL (ref ?–100)
MICROALBUMIN UR-MCNC: 55.7 MG/DL
MICROALBUMIN/CREAT 24H UR-RTO: 565.5 UG/MG (ref ?–30)
NONHDLC SERPL-MCNC: 96 MG/DL (ref ?–130)
OSMOLALITY SERPL CALC.SUM OF ELEC: 289 MOSM/KG (ref 275–295)
POTASSIUM SERPL-SCNC: 4.8 MMOL/L (ref 3.5–5.1)
PROT SERPL-MCNC: 7.2 G/DL (ref 5.7–8.2)
SODIUM SERPL-SCNC: 137 MMOL/L (ref 136–145)
T4 FREE SERPL-MCNC: 1.1 NG/DL (ref 0.8–1.7)
TRIGL SERPL-MCNC: 76 MG/DL (ref 30–149)
TSI SER-ACNC: 1.9 UIU/ML (ref 0.55–4.78)
VLDLC SERPL CALC-MCNC: 12 MG/DL (ref 0–30)

## 2025-04-22 PROCEDURE — 84443 ASSAY THYROID STIM HORMONE: CPT | Performed by: FAMILY MEDICINE

## 2025-04-22 PROCEDURE — 82570 ASSAY OF URINE CREATININE: CPT | Performed by: FAMILY MEDICINE

## 2025-04-22 PROCEDURE — 3044F HG A1C LEVEL LT 7.0%: CPT | Performed by: FAMILY MEDICINE

## 2025-04-22 PROCEDURE — 3008F BODY MASS INDEX DOCD: CPT | Performed by: FAMILY MEDICINE

## 2025-04-22 PROCEDURE — 3078F DIAST BP <80 MM HG: CPT | Performed by: FAMILY MEDICINE

## 2025-04-22 PROCEDURE — 3060F POS MICROALBUMINURIA REV: CPT | Performed by: FAMILY MEDICINE

## 2025-04-22 PROCEDURE — 85240 CLOT FACTOR VIII AHG 1 STAGE: CPT | Performed by: FAMILY MEDICINE

## 2025-04-22 PROCEDURE — 99214 OFFICE O/P EST MOD 30 MIN: CPT | Performed by: FAMILY MEDICINE

## 2025-04-22 PROCEDURE — G2211 COMPLEX E/M VISIT ADD ON: HCPCS | Performed by: FAMILY MEDICINE

## 2025-04-22 PROCEDURE — 80061 LIPID PANEL: CPT | Performed by: FAMILY MEDICINE

## 2025-04-22 PROCEDURE — 80053 COMPREHEN METABOLIC PANEL: CPT | Performed by: FAMILY MEDICINE

## 2025-04-22 PROCEDURE — 82043 UR ALBUMIN QUANTITATIVE: CPT | Performed by: FAMILY MEDICINE

## 2025-04-22 PROCEDURE — 3075F SYST BP GE 130 - 139MM HG: CPT | Performed by: FAMILY MEDICINE

## 2025-04-22 PROCEDURE — 84439 ASSAY OF FREE THYROXINE: CPT | Performed by: FAMILY MEDICINE

## 2025-04-22 PROCEDURE — 83036 HEMOGLOBIN GLYCOSYLATED A1C: CPT | Performed by: FAMILY MEDICINE

## 2025-04-22 NOTE — PROGRESS NOTES
Seema Blanchard is a 64 year old female.    CC:  No chief complaint on file.      Subjective:      No Further Nursing Notes to Review  Tobacco Reviewed  Allergies   Reviewed  Medications Reviewed  Problem List Reviewed  Medical History   Reviewed  Surgical History Reviewed  OB Status Reviewed  Family History   Reviewed           HPI:    History of Present Illness  Seema Blanchard is a 64 year old female who presents for a routine follow-up visit.    Her vision is currently stable following a change in medication. She now receives injections every four weeks in her right eye, compared to the previous schedule of every twelve weeks. This adjustment was described as 'starting all over again' due to the medication change.    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 She has experienced epistaxis in the past related to this condition.    She is currently on atorvastatin for cholesterol management and Xarelto. No need for medication refills at this time.    No swelling in her ankles, but she cuts her socks because they leave marks. No sores or tingling in her feet, although her feet get cold at work due to standing on concrete, which is alleviated by standing on a rubber mat.    She smokes about half a pack a day, sometimes more on weekends. She has attempted to quit in the past and is currently trying to quit again. She hides her smoking from her family, who believe she is a nonsmoker.    Her sleep was previously affected by working twelve-hour days, but her work hours have since decreased.       History/Other:    Allergies:  Allergies[1]   Current Meds:  has a current medication list which includes the following prescription(s): sitagliptin phosphate, atorvastatin, metformin, xarelto, amlodipine, aspirin, true metrix air glucose meter, true metrix blood glucose test, lancets ultra thin 30g, cholecalciferol, and magnesium.        History:  Past Medical History[2]   Past Surgical History[3]   Family History[4]   No family status information on file.      Short Social Hx on File[5]         Immunization History   Administered Date(s) Administered    >=3 YRS TRI  MULTIDOSE VIAL (08216) FLU [facility]IC 10/26/2021    Covid-19 Vaccine Moderna 100 mcg/0.5 ml 04/23/2021, 05/18/2021    Covid-19 Vaccine Moderna 50 Mcg/0.25 Ml 01/15/2022    FLUZONE 6 months and older PFS 0.5 ml (12245) 01/04/2017, 11/03/2022, 01/30/2024    Flucelvax Influenza vaccine, trivalent (ccIIV3), 0.5mL IM 11/09/2019, 10/03/2020    Influenza 11/09/2019, 10/03/2020, 10/26/2021    Pneumococcal Conjugate PCV20 01/30/2024    TDAP 08/16/2024       Tobacco:  Tobacco Use[6]  E-Cigarettes/Vaping       Questions Responses    E-Cigarette Use Never User           Tobacco cessation counseling for <3 minutes.      Wt Readings from Last 6 Encounters:   04/22/25 223 lb 6 oz (101.3 kg)   10/03/24 227 lb (103 kg)   08/16/24 226 lb 2 oz (102.6 kg)   05/09/24 240 lb (108.9 kg)   03/19/24 245 lb (111.1 kg)   01/30/24 249 lb 2 oz (113 kg)       BP Readings from Last 3 Encounters:   04/22/25 136/60   10/03/24 136/70   08/16/24 138/60       Results       REVIEW OF SYSTEMS:   GENERAL: feels well otherwise  SKIN: denies any unusual skin lesions  EYES:denies blurred vision or double vision  HEENT: denies nasal congestion, sinus pain or ST  LUNGS: denies shortness of breath with exertion  CARDIOVASCULAR: denies chest pain on exertion  GI: denies abdominal pain,denies heartburn   : denies dysuria or changes in stream or incontinence  MUSCULOSKELETAL: denies back pain  NEURO: denies headaches  PSYCHE: denies depression or anxiety  HEMATOLOGIC: denies hx of anemia  Objective:    EXAM:   Blood pressure 136/60, pulse 63, temperature 98.4 °F (36.9 °C), temperature source Temporal, resp. rate 12, height 5' 4.75\" (1.645 m), weight 223 lb 6 oz (101.3 kg), SpO2 97%.  Body mass index is 37.46 kg/m².  No LMP  recorded. Patient is postmenopausal.    Reviewed by Dr Grey    Physical Exam  HEENT: Ears normal, external auditory canals clear, tympanic membranes normal  CHEST: Clear to auscultation bilaterally    GENERAL: well developed, well nourished,in no apparent distress  SKIN: no rashes,no suspicious lesions  HEENT: atraumatic, normocephalic,ears and throat are clear  EYES:PERRL, EOMI, conjunctiva are clear  NECK: supple,no adenopathy,no bruits  CHEST: no chest tenderness  BREAST: defer  LUNGS: clear to auscultation  CARDIO: RRR without murmur  GI: good BS's,no masses, HSM or tenderness  : defer  RECTAL:defer  MUSCULOSKELETAL: back is not tender,FROM of the back  EXTREMITIES: no cyanosis, clubbing or edema  Bilateral barefoot skin diabetic exam is normal, visualized feet and the appearance is normal.  Bilateral monofilament/sensation of both feet is normal.  Pulsation pedal pulse exam of both lower legs/feet is normal as well.     NEURO: Oriented times three,cranial nerves are intact,motor and sensory are grossly intact      Assessment & Plan:       ASSESSMENT:    Assessment & Plan  Vision changes requiring injections  Vision stable with current injection schedule. Adjusted to four-week interval due to medication change.  - Continue injections every four weeks in the right eye.    Smoking  Smoking half a pack daily, more on weekends. Interested in cessation, using motivational content. Feels guilty about smoking.  - Encouraged smoking cessation.  - Discussed benefits of quitting and potential strategies.    Wellness Visit  Blood pressure controlled. Mammogram due November 2025. Blood work last done August 2024.  - Perform blood work including A1c, cholesterol, and factor VII levels.  - Obtain urine sample for analysis.       1. Encounter for long-term (current) use of medications (Primary)  2. Type 2 diabetes mellitus with hyperglycemia, without long-term current use of insulin (HCC)  Overview:  Blood Sugar  Medications            METFORMIN 850 MG Oral Tab            Assessment & Plan:  Diabetes: A1c is 5.8 done 4/22/2025 which shows Excellent control. Continue current meds and lifestyle modification.   DM Meds: metFORMIN Tabs - 850 MG; SITagliptin Phosphate Tabs - 100 MG   Oral Diabetes Med Adherence Good at 98%    Diabetic Complications: CKD 3b (GFR 42).     Diabetes is: stable Continue current treatment regimen. Reassess Diabetes in : in 6 months   Orders:  -     Comp Metabolic Panel (14); Future; Expected date: 04/22/2025  -     Lipid Panel; Future; Expected date: 04/22/2025  -     Hemoglobin A1C; Future; Expected date: 04/22/2025  -     Microalb/Creat Ratio, Random Urine; Future; Expected date: 04/22/2025  -     Comp Metabolic Panel (14)  -     Lipid Panel  -     Hemoglobin A1C  -     Microalb/Creat Ratio, Random Urine  3. Acquired coagulation disorder (HCC)  4. Paroxysmal atrial fibrillation (HCC)  Overview:    On plavix and eliquis  Assessment & Plan:   Stable    [x] chronic stable condition, noted historically, continues present status   5. CKD stage 3b, GFR 30-44 ml/min (HCC)  6. Laboratory examination ordered as part of a routine general medical examination  -     Comp Metabolic Panel (14); Future; Expected date: 04/22/2025  -     Lipid Panel; Future; Expected date: 04/22/2025  -     Hemoglobin A1C; Future; Expected date: 04/22/2025  -     TSH and Free T4; Future; Expected date: 04/22/2025  -     Microalb/Creat Ratio, Random Urine; Future; Expected date: 04/22/2025  -     Comp Metabolic Panel (14)  -     Lipid Panel  -     Hemoglobin A1C  -     TSH and Free T4  -     Microalb/Creat Ratio, Random Urine  7. Screening for thyroid disorder  -     TSH and Free T4; Future; Expected date: 04/22/2025  -     TSH and Free T4  8. Family history of coagulation disorder  -     Factor VIII Activity; Future; Expected date: 04/22/2025  -     Factor VIII Activity  9. Peripheral vascular disease  Assessment & Plan:   Stable     [x] chronic stable condition, noted historically, continues present status   10. Encounter for tobacco use cessation counseling  -     Tobacco Cessation Discussion  11. Obesity, Class III, BMI 40-49.9 (morbid obesity)  Assessment & Plan:  BMI, height, weight: Height: 5' 4.75\" (1.645 m), Weight: 223 lb 6 oz (101.3 kg), Body mass index is 37.46 kg/m².. Counseled weight loss diet and exercise. BMI is affecting their diabetes.               Meds & Refills for this Visit:  Requested Prescriptions      No prescriptions requested or ordered in this encounter                  [1] No Known Allergies  [2]   Past Medical History:   Benign neoplasm of sigmoid colon    Easy bruising    Eye disease    Leg swelling    Stented coronary artery    Wears glasses   [3]   Past Surgical History:  Procedure Laterality Date    Shankar localization wire 1 site right (cpt=[id number])      Needle biopsy right Right 04/08/2024    Adipose tissue   [4] History reviewed. No pertinent family history.  [5]   Social History  Socioeconomic History    Marital status:    Tobacco Use    Smoking status: Every Day     Current packs/day: 0.50     Types: Cigarettes    Smokeless tobacco: Never   Vaping Use    Vaping status: Never Used   Substance and Sexual Activity    Alcohol use: Never    Drug use: Never   [6]   Social History  Tobacco Use   Smoking Status Every Day    Current packs/day: 0.50    Types: Cigarettes   Smokeless Tobacco Never

## 2025-04-22 NOTE — PROGRESS NOTES
The following individual(s) verbally consented to be recorded using ambient AI listening technology and understand that they can each withdraw their consent to this listening technology at any point by asking the clinician to turn off or pause the recording:    Patient name: Seema Blanchard  Additional names:

## 2025-04-23 ENCOUNTER — MED REC SCAN ONLY (OUTPATIENT)
Dept: FAMILY MEDICINE CLINIC | Facility: CLINIC | Age: 65
End: 2025-04-23

## 2025-04-23 ENCOUNTER — TELEPHONE (OUTPATIENT)
Dept: FAMILY MEDICINE CLINIC | Facility: CLINIC | Age: 65
End: 2025-04-23

## 2025-04-23 PROBLEM — D12.5 BENIGN NEOPLASM OF SIGMOID COLON: Status: RESOLVED | Noted: 2024-05-10 | Resolved: 2025-04-23

## 2025-04-23 LAB
EST. AVERAGE GLUCOSE BLD GHB EST-MCNC: 120 MG/DL (ref 68–126)
HBA1C MFR BLD: 5.8 % (ref ?–5.7)

## 2025-04-23 NOTE — TELEPHONE ENCOUNTER
Received patient's diabetic eye exam results. After careful review, Dr. Grey states \"positive for diabetic retinopathy\". Results entered into care gap.

## 2025-04-24 NOTE — ASSESSMENT & PLAN NOTE
Diabetes: A1c is 5.8 done 4/22/2025 which shows Excellent control. Continue current meds and lifestyle modification.   DM Meds: metFORMIN Tabs - 850 MG; SITagliptin Phosphate Tabs - 100 MG   Oral Diabetes Med Adherence Good at 98%    Diabetic Complications: CKD 3b (GFR 42).     Diabetes is: stable Continue current treatment regimen. Reassess Diabetes in : in 6 months

## 2025-04-24 NOTE — ASSESSMENT & PLAN NOTE
BMI, height, weight: Height: 5' 4.75\" (1.645 m), Weight: 223 lb 6 oz (101.3 kg), Body mass index is 37.46 kg/m².. Counseled weight loss diet and exercise. BMI is affecting their diabetes.

## 2025-04-25 LAB — FACTOR VIII ACT: 99 %

## 2025-05-30 DIAGNOSIS — E11.65 TYPE 2 DIABETES MELLITUS WITH HYPERGLYCEMIA, WITHOUT LONG-TERM CURRENT USE OF INSULIN (HCC): ICD-10-CM

## 2025-05-30 NOTE — TELEPHONE ENCOUNTER
OV 04/22/25  LABS 04/23/25 A1c 5.8    REFILL 03/04/25 #90    No future appointments. Labs due 10/23/25

## 2025-06-09 RX ORDER — AMLODIPINE BESYLATE 10 MG/1
10 TABLET ORAL DAILY
Qty: 90 TABLET | Refills: 1 | Status: SHIPPED | OUTPATIENT
Start: 2025-06-09

## 2025-06-09 NOTE — TELEPHONE ENCOUNTER
AMLODIPINE 10 MG Oral Tab     Hypertension Medications Protocol [id number]/08/2025 02:47 PM   Protocol Details EGFRCR or GFRNAA > 50    CMP or BMP in past 12 months    Last BP reading less than 140/90    In person appointment or virtual visit in the past 12 mos or appointment in next 3 mos    Medication is active on med list      Last office visit:  medication f/u: 4/22/25  No future appointments.  Last filled: 12/11/24 #90 with 1 refill  Last labs: 4/22/25 eGFR-CR: below 42  Last BP:   BP Readings from Last 3 Encounters:   04/22/25 136/60   10/03/24 136/70   08/16/24 138/60

## 2025-06-25 DIAGNOSIS — E11.69 MIXED HYPERLIPIDEMIA DUE TO TYPE 2 DIABETES MELLITUS (HCC): ICD-10-CM

## 2025-06-25 DIAGNOSIS — E78.2 MIXED HYPERLIPIDEMIA DUE TO TYPE 2 DIABETES MELLITUS (HCC): ICD-10-CM

## 2025-06-25 DIAGNOSIS — E11.65 TYPE 2 DIABETES MELLITUS WITH HYPERGLYCEMIA, WITHOUT LONG-TERM CURRENT USE OF INSULIN (HCC): ICD-10-CM

## 2025-06-26 RX ORDER — ATORVASTATIN CALCIUM 40 MG/1
40 TABLET, FILM COATED ORAL NIGHTLY
Qty: 90 TABLET | Refills: 0 | Status: SHIPPED | OUTPATIENT
Start: 2025-06-26

## 2025-06-26 NOTE — TELEPHONE ENCOUNTER
Last refill: 03/31/25  Qty 90  W/ 0 refills  Last ov 04/22/25    Requested Prescriptions     Pending Prescriptions Disp Refills    ATORVASTATIN 40 MG Oral Tab [Pharmacy Med Name: ATORVASTATIN 40MG TABLETS] 90 tablet 0     Sig: TAKE 1 TABLET(40 MG) BY MOUTH EVERY NIGHT   No future appointments.

## 2025-06-30 RX ORDER — SITAGLIPTIN 100 MG/1
100 TABLET, FILM COATED ORAL DAILY
Qty: 90 TABLET | Refills: 0 | Status: SHIPPED | OUTPATIENT
Start: 2025-06-30

## 2025-06-30 NOTE — TELEPHONE ENCOUNTER
OV 04/22/25  LABS 04/23/25 A1c 5.8    REFILL 04/09/25 #90    No future appointments. Labs due 10/23/25

## 2025-07-01 ENCOUNTER — MED REC SCAN ONLY (OUTPATIENT)
Dept: FAMILY MEDICINE CLINIC | Facility: CLINIC | Age: 65
End: 2025-07-01

## 2025-07-01 ENCOUNTER — TELEPHONE (OUTPATIENT)
Dept: FAMILY MEDICINE CLINIC | Facility: CLINIC | Age: 65
End: 2025-07-01

## 2025-07-01 NOTE — TELEPHONE ENCOUNTER
Received fax from [facility] with eye exam notes dated  6-30-25    Dr Grey carefully reviewed and documented:  Positive diabetic retinopathy, put in care gap    Epic care gap updated, copy to scan.

## 2025-08-27 ENCOUNTER — TELEPHONE (OUTPATIENT)
Dept: FAMILY MEDICINE CLINIC | Facility: CLINIC | Age: 65
End: 2025-08-27

## 2025-08-28 DIAGNOSIS — E11.65 TYPE 2 DIABETES MELLITUS WITH HYPERGLYCEMIA, WITHOUT LONG-TERM CURRENT USE OF INSULIN (HCC): ICD-10-CM

## 2025-08-28 RX ORDER — SITAGLIPTIN 100 MG/1
100 TABLET, FILM COATED ORAL DAILY
Qty: 90 TABLET | Refills: 0 | Status: SHIPPED | OUTPATIENT
Start: 2025-08-28

## (undated) NOTE — LETTER
2014 [address]  [phone]          Date: 7/26/2022      Patient Name: Chidi Rey            To Whom it may concern: The above patient was evaluated at the [facility] for treatment of a medical condition. The patient may return to work on 8/1/2022    Restrictions: none.     Sincerely,      Olu Cueva MD